# Patient Record
Sex: FEMALE | Race: OTHER | HISPANIC OR LATINO | Employment: PART TIME | URBAN - METROPOLITAN AREA
[De-identification: names, ages, dates, MRNs, and addresses within clinical notes are randomized per-mention and may not be internally consistent; named-entity substitution may affect disease eponyms.]

---

## 2017-06-14 ENCOUNTER — HOSPITAL ENCOUNTER (INPATIENT)
Facility: MEDICAL CENTER | Age: 57
LOS: 3 days | DRG: 872 | End: 2017-06-17
Attending: EMERGENCY MEDICINE | Admitting: UROLOGY
Payer: COMMERCIAL

## 2017-06-14 ENCOUNTER — RESOLUTE PROFESSIONAL BILLING HOSPITAL PROF FEE (OUTPATIENT)
Dept: HOSPITALIST | Facility: MEDICAL CENTER | Age: 57
End: 2017-06-14
Payer: COMMERCIAL

## 2017-06-14 ENCOUNTER — APPOINTMENT (OUTPATIENT)
Dept: RADIOLOGY | Facility: MEDICAL CENTER | Age: 57
DRG: 872 | End: 2017-06-14
Attending: UROLOGY
Payer: COMMERCIAL

## 2017-06-14 ENCOUNTER — HOSPITAL ENCOUNTER (OUTPATIENT)
Dept: RADIOLOGY | Facility: MEDICAL CENTER | Age: 57
End: 2017-06-14
Attending: UROLOGY
Payer: COMMERCIAL

## 2017-06-14 DIAGNOSIS — N20.0 URIC ACID NEPHROLITHIASIS: ICD-10-CM

## 2017-06-14 LAB
ALBUMIN SERPL BCP-MCNC: 3.6 G/DL (ref 3.2–4.9)
ALBUMIN/GLOB SERPL: 1 G/DL
ALP SERPL-CCNC: 66 U/L (ref 30–99)
ALT SERPL-CCNC: 14 U/L (ref 2–50)
ANION GAP SERPL CALC-SCNC: 11 MMOL/L (ref 0–11.9)
APPEARANCE UR: ABNORMAL
APTT PPP: 37 SEC (ref 24.7–36)
AST SERPL-CCNC: 17 U/L (ref 12–45)
BACTERIA #/AREA URNS HPF: ABNORMAL /HPF
BASOPHILS # BLD AUTO: 0.1 % (ref 0–1.8)
BASOPHILS # BLD: 0.01 K/UL (ref 0–0.12)
BILIRUB SERPL-MCNC: 0.9 MG/DL (ref 0.1–1.5)
BILIRUB UR QL STRIP.AUTO: NEGATIVE
BUN SERPL-MCNC: 20 MG/DL (ref 8–22)
CALCIUM SERPL-MCNC: 8.5 MG/DL (ref 8.4–10.2)
CHLORIDE SERPL-SCNC: 100 MMOL/L (ref 96–112)
CO2 SERPL-SCNC: 21 MMOL/L (ref 20–33)
COLOR UR: YELLOW
CREAT SERPL-MCNC: 1.27 MG/DL (ref 0.5–1.4)
CULTURE IF INDICATED INDCX: YES UA CULTURE
EOSINOPHIL # BLD AUTO: 0 K/UL (ref 0–0.51)
EOSINOPHIL NFR BLD: 0 % (ref 0–6.9)
EPI CELLS #/AREA URNS HPF: ABNORMAL /HPF
ERYTHROCYTE [DISTWIDTH] IN BLOOD BY AUTOMATED COUNT: 44.5 FL (ref 35.9–50)
GFR SERPL CREATININE-BSD FRML MDRD: 43 ML/MIN/1.73 M 2
GLOBULIN SER CALC-MCNC: 3.5 G/DL (ref 1.9–3.5)
GLUCOSE SERPL-MCNC: 169 MG/DL (ref 65–99)
GLUCOSE UR STRIP.AUTO-MCNC: NEGATIVE MG/DL
HCT VFR BLD AUTO: 36.9 % (ref 37–47)
HGB BLD-MCNC: 12.6 G/DL (ref 12–16)
IMM GRANULOCYTES # BLD AUTO: 0.07 K/UL (ref 0–0.11)
IMM GRANULOCYTES NFR BLD AUTO: 0.5 % (ref 0–0.9)
INR PPP: 1.2 (ref 0.87–1.13)
KETONES UR STRIP.AUTO-MCNC: NEGATIVE MG/DL
LACTATE BLD-SCNC: 0.91 MMOL/L (ref 0.5–2)
LEUKOCYTE ESTERASE UR QL STRIP.AUTO: ABNORMAL
LYMPHOCYTES # BLD AUTO: 1.25 K/UL (ref 1–4.8)
LYMPHOCYTES NFR BLD: 8.3 % (ref 22–41)
MAGNESIUM SERPL-MCNC: 1.9 MG/DL (ref 1.5–2.5)
MCH RBC QN AUTO: 30.7 PG (ref 27–33)
MCHC RBC AUTO-ENTMCNC: 34.1 G/DL (ref 33.6–35)
MCV RBC AUTO: 90 FL (ref 81.4–97.8)
MICRO URNS: ABNORMAL
MONOCYTES # BLD AUTO: 1.41 K/UL (ref 0–0.85)
MONOCYTES NFR BLD AUTO: 9.4 % (ref 0–13.4)
MUCOUS THREADS #/AREA URNS HPF: ABNORMAL /HPF
NEUTROPHILS # BLD AUTO: 12.3 K/UL (ref 2–7.15)
NEUTROPHILS NFR BLD: 81.7 % (ref 44–72)
NITRITE UR QL STRIP.AUTO: NEGATIVE
NRBC # BLD AUTO: 0 K/UL
NRBC BLD AUTO-RTO: 0 /100 WBC
PH UR STRIP.AUTO: 5.5 [PH]
PHOSPHATE SERPL-MCNC: 2.7 MG/DL (ref 2.5–4.5)
PLATELET # BLD AUTO: 244 K/UL (ref 164–446)
PMV BLD AUTO: 8.6 FL (ref 9–12.9)
POTASSIUM SERPL-SCNC: 3.5 MMOL/L (ref 3.6–5.5)
PROT SERPL-MCNC: 7.1 G/DL (ref 6–8.2)
PROT UR QL STRIP: 30 MG/DL
PROTHROMBIN TIME: 15 SEC (ref 12–14.6)
RBC # BLD AUTO: 4.1 M/UL (ref 4.2–5.4)
RBC # URNS HPF: ABNORMAL /HPF
RBC UR QL AUTO: ABNORMAL
SODIUM SERPL-SCNC: 132 MMOL/L (ref 135–145)
SP GR UR STRIP.AUTO: 1.02
SPECIMEN DRAWN FROM PATIENT: NORMAL
TROPONIN I SERPL-MCNC: <0.02 NG/ML (ref 0–0.04)
TSH SERPL DL<=0.005 MIU/L-ACNC: 0.5 UIU/ML (ref 0.35–5.5)
UNIDENT CRYS URNS QL MICRO: ABNORMAL /HPF
WBC # BLD AUTO: 15 K/UL (ref 4.8–10.8)
WBC #/AREA URNS HPF: ABNORMAL /HPF

## 2017-06-14 PROCEDURE — 87040 BLOOD CULTURE FOR BACTERIA: CPT | Mod: 91

## 2017-06-14 PROCEDURE — 770006 HCHG ROOM/CARE - MED/SURG/GYN SEMI*

## 2017-06-14 PROCEDURE — 160048 HCHG OR STATISTICAL LEVEL 1-5: Performed by: UROLOGY

## 2017-06-14 PROCEDURE — 99291 CRITICAL CARE FIRST HOUR: CPT

## 2017-06-14 PROCEDURE — 501329 HCHG SET, CYSTO IRRIG Y TUR: Performed by: UROLOGY

## 2017-06-14 PROCEDURE — 700102 HCHG RX REV CODE 250 W/ 637 OVERRIDE(OP): Performed by: HOSPITALIST

## 2017-06-14 PROCEDURE — 84100 ASSAY OF PHOSPHORUS: CPT

## 2017-06-14 PROCEDURE — 160035 HCHG PACU - 1ST 60 MINS PHASE I: Performed by: UROLOGY

## 2017-06-14 PROCEDURE — C1758 CATHETER, URETERAL: HCPCS | Performed by: UROLOGY

## 2017-06-14 PROCEDURE — 82533 TOTAL CORTISOL: CPT

## 2017-06-14 PROCEDURE — 0T768DZ DILATION OF RIGHT URETER WITH INTRALUMINAL DEVICE, VIA NATURAL OR ARTIFICIAL OPENING ENDOSCOPIC: ICD-10-PCS | Performed by: UROLOGY

## 2017-06-14 PROCEDURE — 87086 URINE CULTURE/COLONY COUNT: CPT

## 2017-06-14 PROCEDURE — 700101 HCHG RX REV CODE 250

## 2017-06-14 PROCEDURE — 160028 HCHG SURGERY MINUTES - 1ST 30 MINS LEVEL 3: Performed by: UROLOGY

## 2017-06-14 PROCEDURE — 96366 THER/PROPH/DIAG IV INF ADDON: CPT

## 2017-06-14 PROCEDURE — C1769 GUIDE WIRE: HCPCS | Performed by: UROLOGY

## 2017-06-14 PROCEDURE — A9270 NON-COVERED ITEM OR SERVICE: HCPCS | Performed by: HOSPITALIST

## 2017-06-14 PROCEDURE — 87077 CULTURE AEROBIC IDENTIFY: CPT

## 2017-06-14 PROCEDURE — 96365 THER/PROPH/DIAG IV INF INIT: CPT

## 2017-06-14 PROCEDURE — 80053 COMPREHEN METABOLIC PANEL: CPT

## 2017-06-14 PROCEDURE — 160039 HCHG SURGERY MINUTES - EA ADDL 1 MIN LEVEL 3: Performed by: UROLOGY

## 2017-06-14 PROCEDURE — 83735 ASSAY OF MAGNESIUM: CPT

## 2017-06-14 PROCEDURE — C2617 STENT, NON-COR, TEM W/O DEL: HCPCS | Performed by: UROLOGY

## 2017-06-14 PROCEDURE — 84443 ASSAY THYROID STIM HORMONE: CPT

## 2017-06-14 PROCEDURE — 700111 HCHG RX REV CODE 636 W/ 250 OVERRIDE (IP)

## 2017-06-14 PROCEDURE — 51701 INSERT BLADDER CATHETER: CPT

## 2017-06-14 PROCEDURE — 99223 1ST HOSP IP/OBS HIGH 75: CPT | Performed by: HOSPITALIST

## 2017-06-14 PROCEDURE — 74176 CT ABD & PELVIS W/O CONTRAST: CPT

## 2017-06-14 PROCEDURE — 36415 COLL VENOUS BLD VENIPUNCTURE: CPT

## 2017-06-14 PROCEDURE — 85610 PROTHROMBIN TIME: CPT

## 2017-06-14 PROCEDURE — 87186 SC STD MICRODIL/AGAR DIL: CPT

## 2017-06-14 PROCEDURE — 500879 HCHG PACK, CYSTO: Performed by: UROLOGY

## 2017-06-14 PROCEDURE — 700105 HCHG RX REV CODE 258: Performed by: EMERGENCY MEDICINE

## 2017-06-14 PROCEDURE — 700105 HCHG RX REV CODE 258: Performed by: HOSPITALIST

## 2017-06-14 PROCEDURE — 700117 HCHG RX CONTRAST REV CODE 255

## 2017-06-14 PROCEDURE — 81001 URINALYSIS AUTO W/SCOPE: CPT

## 2017-06-14 PROCEDURE — 84484 ASSAY OF TROPONIN QUANT: CPT

## 2017-06-14 PROCEDURE — 85730 THROMBOPLASTIN TIME PARTIAL: CPT

## 2017-06-14 PROCEDURE — 160002 HCHG RECOVERY MINUTES (STAT): Performed by: UROLOGY

## 2017-06-14 PROCEDURE — 700111 HCHG RX REV CODE 636 W/ 250 OVERRIDE (IP): Performed by: EMERGENCY MEDICINE

## 2017-06-14 PROCEDURE — 85025 COMPLETE CBC W/AUTO DIFF WBC: CPT

## 2017-06-14 PROCEDURE — 160009 HCHG ANES TIME/MIN: Performed by: UROLOGY

## 2017-06-14 PROCEDURE — 84145 PROCALCITONIN (PCT): CPT

## 2017-06-14 PROCEDURE — A4314 CATH W/DRAINAGE 2-WAY LATEX: HCPCS | Performed by: UROLOGY

## 2017-06-14 PROCEDURE — 83605 ASSAY OF LACTIC ACID: CPT

## 2017-06-14 DEVICE — STENT UROLOGICAL POLARIS 6X24  ULTRA: Type: IMPLANTABLE DEVICE | Site: URETER | Status: FUNCTIONAL

## 2017-06-14 RX ORDER — CEFTRIAXONE 1 G/1
1 INJECTION, POWDER, FOR SOLUTION INTRAMUSCULAR; INTRAVENOUS ONCE
Status: COMPLETED | OUTPATIENT
Start: 2017-06-14 | End: 2017-06-14

## 2017-06-14 RX ORDER — SODIUM CHLORIDE 9 MG/ML
30 INJECTION, SOLUTION INTRAVENOUS ONCE
Status: COMPLETED | OUTPATIENT
Start: 2017-06-14 | End: 2017-06-14

## 2017-06-14 RX ORDER — ONDANSETRON 4 MG/1
4 TABLET, ORALLY DISINTEGRATING ORAL EVERY 4 HOURS PRN
Status: DISCONTINUED | OUTPATIENT
Start: 2017-06-14 | End: 2017-06-17 | Stop reason: HOSPADM

## 2017-06-14 RX ORDER — ONDANSETRON 2 MG/ML
4 INJECTION INTRAMUSCULAR; INTRAVENOUS ONCE
Status: COMPLETED | OUTPATIENT
Start: 2017-06-14 | End: 2017-06-14

## 2017-06-14 RX ORDER — AMOXICILLIN 250 MG
2 CAPSULE ORAL 2 TIMES DAILY
Status: DISCONTINUED | OUTPATIENT
Start: 2017-06-15 | End: 2017-06-17 | Stop reason: HOSPADM

## 2017-06-14 RX ORDER — ONDANSETRON 2 MG/ML
4 INJECTION INTRAMUSCULAR; INTRAVENOUS EVERY 4 HOURS PRN
Status: DISCONTINUED | OUTPATIENT
Start: 2017-06-14 | End: 2017-06-17 | Stop reason: HOSPADM

## 2017-06-14 RX ORDER — HEPARIN SODIUM 5000 [USP'U]/ML
5000 INJECTION, SOLUTION INTRAVENOUS; SUBCUTANEOUS EVERY 8 HOURS
Status: DISCONTINUED | OUTPATIENT
Start: 2017-06-15 | End: 2017-06-17 | Stop reason: HOSPADM

## 2017-06-14 RX ORDER — POLYETHYLENE GLYCOL 3350 17 G/17G
1 POWDER, FOR SOLUTION ORAL
Status: DISCONTINUED | OUTPATIENT
Start: 2017-06-14 | End: 2017-06-17 | Stop reason: HOSPADM

## 2017-06-14 RX ORDER — SODIUM CHLORIDE 9 MG/ML
INJECTION, SOLUTION INTRAVENOUS CONTINUOUS
Status: DISCONTINUED | OUTPATIENT
Start: 2017-06-14 | End: 2017-06-16

## 2017-06-14 RX ORDER — ACETAMINOPHEN 325 MG/1
650 TABLET ORAL EVERY 6 HOURS PRN
Status: DISCONTINUED | OUTPATIENT
Start: 2017-06-14 | End: 2017-06-17 | Stop reason: HOSPADM

## 2017-06-14 RX ORDER — HYDROCODONE BITARTRATE AND ACETAMINOPHEN 5; 325 MG/1; MG/1
1 TABLET ORAL EVERY 4 HOURS PRN
Status: ON HOLD | COMMUNITY
Start: 2017-05-30 | End: 2017-06-17

## 2017-06-14 RX ORDER — MEPERIDINE HYDROCHLORIDE 25 MG/ML
INJECTION INTRAMUSCULAR; INTRAVENOUS; SUBCUTANEOUS
Status: COMPLETED
Start: 2017-06-14 | End: 2017-06-14

## 2017-06-14 RX ORDER — PROMETHAZINE HYDROCHLORIDE 25 MG/1
12.5-25 TABLET ORAL EVERY 4 HOURS PRN
Status: DISCONTINUED | OUTPATIENT
Start: 2017-06-14 | End: 2017-06-17 | Stop reason: HOSPADM

## 2017-06-14 RX ORDER — POTASSIUM CHLORIDE 20 MEQ/1
20 TABLET, EXTENDED RELEASE ORAL 2 TIMES DAILY
Status: DISCONTINUED | OUTPATIENT
Start: 2017-06-14 | End: 2017-06-17 | Stop reason: HOSPADM

## 2017-06-14 RX ORDER — PROMETHAZINE HYDROCHLORIDE 25 MG/1
12.5-25 SUPPOSITORY RECTAL EVERY 4 HOURS PRN
Status: DISCONTINUED | OUTPATIENT
Start: 2017-06-14 | End: 2017-06-17 | Stop reason: HOSPADM

## 2017-06-14 RX ORDER — MORPHINE SULFATE 4 MG/ML
4 INJECTION, SOLUTION INTRAMUSCULAR; INTRAVENOUS ONCE
Status: COMPLETED | OUTPATIENT
Start: 2017-06-14 | End: 2017-06-14

## 2017-06-14 RX ORDER — SODIUM CHLORIDE 9 MG/ML
30 INJECTION, SOLUTION INTRAVENOUS
Status: DISCONTINUED | OUTPATIENT
Start: 2017-06-14 | End: 2017-06-14

## 2017-06-14 RX ORDER — BISACODYL 10 MG
10 SUPPOSITORY, RECTAL RECTAL
Status: DISCONTINUED | OUTPATIENT
Start: 2017-06-14 | End: 2017-06-17 | Stop reason: HOSPADM

## 2017-06-14 RX ORDER — SODIUM CHLORIDE 9 MG/ML
1000 INJECTION, SOLUTION INTRAVENOUS
Status: DISCONTINUED | OUTPATIENT
Start: 2017-06-14 | End: 2017-06-17 | Stop reason: HOSPADM

## 2017-06-14 RX ORDER — TEMAZEPAM 15 MG/1
15 CAPSULE ORAL
Status: DISCONTINUED | OUTPATIENT
Start: 2017-06-14 | End: 2017-06-17 | Stop reason: HOSPADM

## 2017-06-14 RX ADMIN — MEPERIDINE HYDROCHLORIDE 12.5 MG: 25 INJECTION INTRAMUSCULAR; INTRAVENOUS; SUBCUTANEOUS at 21:36

## 2017-06-14 RX ADMIN — CEFTRIAXONE SODIUM 1 G: 1 INJECTION, POWDER, FOR SOLUTION INTRAMUSCULAR; INTRAVENOUS at 19:52

## 2017-06-14 RX ADMIN — POTASSIUM CHLORIDE 20 MEQ: 1500 TABLET, EXTENDED RELEASE ORAL at 23:10

## 2017-06-14 RX ADMIN — SODIUM CHLORIDE 1000 ML: 9 INJECTION, SOLUTION INTRAVENOUS at 19:41

## 2017-06-14 RX ADMIN — MORPHINE SULFATE 4 MG: 4 INJECTION INTRAVENOUS at 19:43

## 2017-06-14 RX ADMIN — CEFTRIAXONE SODIUM 1 G: 1 INJECTION, POWDER, FOR SOLUTION INTRAMUSCULAR; INTRAVENOUS at 20:40

## 2017-06-14 RX ADMIN — ONDANSETRON 4 MG: 2 INJECTION INTRAMUSCULAR; INTRAVENOUS at 19:43

## 2017-06-14 RX ADMIN — SODIUM CHLORIDE 1000 ML: 9 INJECTION, SOLUTION INTRAVENOUS at 21:30

## 2017-06-14 RX ADMIN — SODIUM CHLORIDE: 9 INJECTION, SOLUTION INTRAVENOUS at 22:52

## 2017-06-14 ASSESSMENT — LIFESTYLE VARIABLES
DO YOU DRINK ALCOHOL: NO
EVER_SMOKED: NEVER

## 2017-06-14 ASSESSMENT — PAIN SCALES - GENERAL
PAINLEVEL_OUTOF10: 10
PAINLEVEL_OUTOF10: ASSUMED PAIN PRESENT
PAINLEVEL_OUTOF10: ASSUMED PAIN PRESENT
PAINLEVEL_OUTOF10: 5
PAINLEVEL_OUTOF10: 0

## 2017-06-14 NOTE — IP AVS SNAPSHOT
6/17/2017    Es Evans  [address]  [place] [place] [address]    Dear Es:    [facility] wants to ensure your discharge home is safe and you or your loved ones have had all of your questions answered regarding your care after you leave the hospital.    Below is a list of resources and contact information should you have any questions regarding your hospital stay, follow-up instructions, or active medical symptoms.    Questions or Concerns Regarding… Contact   Medical Questions Related to Your Discharge  (7 days a week, 8am-5pm) Contact a Nurse Care Coordinator   [phone]   Medical Questions Not Related to Your Discharge  (24 hours a day / 7 days a week)  Contact the Nurse Health Line   [phone]    Medications or Discharge Instructions Refer to your discharge packet   or contact your [facility] Primary Care Provider   [phone]   Follow-up Appointment(s) Schedule your appointment via VIRxSYS   or contact Scheduling [phone]   Billing Review your statement via VIRxSYS  or contact Billing [phone]   Medical Records Review your records via VIRxSYS   or contact Medical Records [phone]     You may receive a telephone call within two days of discharge. This call is to make certain you understand your discharge instructions and have the opportunity to have any questions answered. You can also easily access your medical information, test results and upcoming appointments via the VIRxSYS free online health management tool. You can learn more and sign up at [employer]/VIRxSYS. For assistance setting up your VIRxSYS account, please call [phone].    Once again, we want to ensure your discharge home is safe and that you have a clear understanding of any next steps in your care. If you have any questions or concerns, please do not hesitate to contact us, we are here for you. Thank you for choosing [facility] for your healthcare needs.    Sincerely,    Your [facility] Healthcare Team

## 2017-06-14 NOTE — IP AVS SNAPSHOT
" <p align=\"LEFT\"><IMG SRC=\"//[email or web address]/blob$/[email or web address]\" alt=\"Image\" WIDTH=\"50%\" HEIGHT=\"200\" BORDER=\"\"></p>                   Name:Es Evans  Medical Record Number:[id number]  CSN: [id number]    YOB: 1960   Age: 56 y.o.  Sex: female  HT:1.626 m (5' 4\") WT: 69.3 kg (152 lb 12.5 oz)          Admit Date: 6/14/2017     Discharge Date:   Today's Date: 6/17/2017  Attending Doctor:  Shankar Lee D.O.                  Allergies:  Review of patient's allergies indicates no known allergies.          Follow-up Information     1. Follow up with Asad Silva M.D. In 2 weeks.    Specialty:  Urology    Why:  Office will call and schedule stent removal     Contact information    [address] [place] [address] [phone]          2. Follow up with Pcp Pt States None In 1 week.    Specialty:  Family Medicine         Medication List      Take these Medications        Instructions    hydrocodone-acetaminophen 5-325 MG Tabs per tablet   What changed:  how much to take   Commonly known as:  NORCO    Take 1-2 Tabs by mouth every four hours as needed for up to 7 days.   Dose:  1-2 Tab       nitrofurantoin macro crystals 100 MG Caps   Commonly known as:  MACRODANTIN    Take 1 Cap by mouth 4 times a day for 7 days.   Dose:  100 mg       ondansetron 4 MG Tbdp   What changed:    - when to take this  - reasons to take this   Commonly known as:  ZOFRAN ODT    Take 1 Tab by mouth every 6 hours as needed for Nausea/Vomiting.   Dose:  4 mg         "

## 2017-06-14 NOTE — IP AVS SNAPSHOT
Solexant Access Code: [email or web address]  Expires: 7/17/2017  5:09 PM    Your email address is not on file at [facility].  Email Addresses are required for you to sign up for Solexant, please contact [phone] to verify your personal information and to provide your email address prior to attempting to register for Solexant.    Es Evans   [address]  [place] [address]    Solexant  A secure, online tool to manage your health information     [facility]’s Solexant® is a secure, online tool that connects you to your personalized health information from the privacy of your home -- day or night - making it very easy for you to manage your healthcare. Once the activation process is completed, you can even access your medical information using the Solexant dangelo, which is available for free in the Apple Dangelo store or Google Play store.     To learn more about Solexant, visit [email or web address]    There are two levels of access available (as shown below):   My Chart Features  [facility] Primary Care Doctor [facility]  Specialists [facility]  Urgent  Care Non-[facility] Primary Care Doctor   Email your healthcare team securely and privately 24/7 X X X    Manage appointments: schedule your next appointment; view details of past/upcoming appointments X      Request prescription refills. X      View recent personal medical records, including lab and immunizations X X X X   View health record, including health history, allergies, medications X X X X   Read reports about your outpatient visits, procedures, consult and ER notes X X X X   See your discharge summary, which is a recap of your hospital and/or ER visit that includes your diagnosis, lab results, and care plan X X  X     How to register for MightyTextt:  Once your e-mail address has been verified, follow the following steps to sign up for Solexant.     1. Go to  [email or web address]  2. Click on the Sign Up Now box, which takes you to the New Member Sign Up page. You will need to  provide the following information:  a. Enter your [employer] Access Code exactly as it appears at the top of this page. (You will not need to use this code after you’ve completed the sign-up process. If you do not sign up before the expiration date, you must request a new code.)   b. Enter your date of birth.   c. Enter your home email address.   d. Click Submit, and follow the next screen’s instructions.  3. Create a [employer] ID. This will be your [employer] login ID and cannot be changed, so think of one that is secure and easy to remember.  4. Create a [employer] password. You can change your password at any time.  5. Enter your Password Reset Question and Answer. This can be used at a later time if you forget your password.   6. Enter your e-mail address. This allows you to receive e-mail notifications when new information is available in [employer].  7. Click Sign Up. You can now view your health information.    For assistance activating your [employer] account, call [phone]

## 2017-06-14 NOTE — IP AVS SNAPSHOT
" Home Care Instructions                                                                                                                  Name:Es Evans  Medical Record Number:[id number]  CSN: [id number]    YOB: 1960   Age: 56 y.o.  Sex: female  HT:1.626 m (5' 4\") WT: 69.3 kg (152 lb 12.5 oz)          Admit Date: 6/14/2017     Discharge Date:   Today's Date: 6/17/2017  Attending Doctor:  Shankar Lee D.O.                  Allergies:  Review of patient's allergies indicates no known allergies.            Discharge Instructions       Discharge Instructions    Discharged to home by car with relative. Discharged via wheelchair, hospital escort: Yes.  Special equipment needed: Not Applicable    Be sure to schedule a follow-up appointment with your primary care doctor or any specialists as instructed.     Discharge Plan: Home  Influenza Vaccine Indication: Not indicated: Previously immunized this influenza season and > 8 years of age    I understand that a diet low in cholesterol, fat, and sodium is recommended for good health. Unless I have been given specific instructions below for another diet, I accept this instruction as my diet prescription.   Other diet: as desired  Special Instructions: Follow up with Dr. Ledezma for stent removal.      · Is patient discharged on Warfarin / Coumadin?   No     · Is patient Post Blood Transfusion?  No    Depression / Suicide Risk    As you are discharged from this [facility] facility, it is important to learn how to keep safe from harming yourself.    Recognize the warning signs:  · Abrupt changes in personality, positive or negative- including increase in energy   · Giving away possessions  · Change in eating patterns- significant weight changes-  positive or negative  · Change in sleeping patterns- unable to sleep or sleeping all the time   · Unwillingness or inability to communicate  · Depression  · Unusual sadness, discouragement and loneliness  · Talk of wanting to " die  · Neglect of personal appearance   · Rebelliousness- reckless behavior  · Withdrawal from people/activities they love  · Confusion- inability to concentrate     If you or a loved one observes any of these behaviors or has concerns about self-harm, here's what you can do:  · Talk about it- your feelings and reasons for harming yourself  · Remove any means that you might use to hurt yourself (examples: pills, rope, extension cords, firearm)  · Get professional help from the community (Mental Health, Substance Abuse, psychological counseling)  · Do not be alone:Call your Safe Contact- someone whom you trust who will be there for you.  · Call your local CRISIS HOTLINE 028-9447 or 220-818-9045  · Call your local Children's Mobile Crisis Response Team [place] (652) 572-6643 or www.5min Media  · Call the toll free National Suicide Prevention Hotlines   · National Suicide Prevention Lifeline 463-307-RXQW (6183)  · cinvolve Line Network 800-SUICIDE (666-5085)        Follow-up Information     1. Follow up with Asad Silva M.D. In 2 weeks.    Specialty:  Urology    Why:  Office will call and schedule stent removal     Contact information    [address] [place] [id number] [phone]          2. Follow up with Pcp Pt States None In 1 week.    Specialty:  Family Medicine         Discharge Medication Instructions:    Below are the medications your physician expects you to take upon discharge:    Review all your home medications and newly ordered medications with your doctor and/or pharmacist. Follow medication instructions as directed by your doctor and/or pharmacist.    Please keep your medication list with you and share with your physician.               Medication List      START taking these medications        Instructions    Morning Afternoon Evening Bedtime    nitrofurantoin macro crystals 100 MG Caps   Commonly known as:  MACRODANTIN        Take 1 Cap by mouth 4 times a day for 7 days.    Dose:  100 mg                          CHANGE how you take these medications        Instructions    Morning Afternoon Evening Bedtime    hydrocodone-acetaminophen 5-325 MG Tabs per tablet   What changed:  how much to take   Commonly known as:  NORCO        Take 1-2 Tabs by mouth every four hours as needed for up to 7 days.   Dose:  1-2 Tab                        ondansetron 4 MG Tbdp   What changed:    - when to take this  - reasons to take this   Commonly known as:  ZOFRAN ODT        Take 1 Tab by mouth every 6 hours as needed for Nausea/Vomiting.   Dose:  4 mg                          STOP taking these medications     cefdinir 300 MG Caps   Commonly known as:  OMNICEF               ibuprofen 200 MG Tabs   Commonly known as:  MOTRIN               magnesium hydroxide 400 MG/5ML Susp   Commonly known as:  MILK OF MAGNESIA               promethazine 12.5 MG Supp   Commonly known as:  PHENERGAN               tramadol 50 MG Tabs   Commonly known as:  ULTRAM                    Where to Get Your Medications      You can get these medications from any pharmacy     Bring a paper prescription for each of these medications    - hydrocodone-acetaminophen 5-325 MG Tabs per tablet  - nitrofurantoin macro crystals 100 MG Caps  - ondansetron 4 MG Tbdp            Instructions           Diet / Nutrition:    Follow any diet instructions given to you by your doctor or the dietician, including how much salt (sodium) you are allowed each day.    If you are overweight, talk to your doctor about a weight reduction plan.    Activity:    Remain physically active following your doctor's instructions about exercise and activity.    Rest often.     Any time you become even a little tired or short of breath, SIT DOWN and rest.    Worsening Symptoms:    Report any of the following signs and symptoms to the doctor's office immediately:    *Pain of jaw, arm, or neck  *Chest pain not relieved by medication                               *Dizziness  or loss of consciousness  *Difficulty breathing even when at rest   *More tired than usual                                       *Bleeding drainage or swelling of surgical site  *Swelling of feet, ankles, legs or stomach                 *Fever (>100ºF)  *Pink or blood tinged sputum  *Weight gain (3lbs/day or 5lbs /week)           *Shock from internal defibrillator (if applicable)  *Palpitations or irregular heartbeats                *Cool and/or numb extremities    Stroke Awareness    Common Risk Factors for Stroke include:    Age  Atrial Fibrillation  Carotid Artery Stenosis  Diabetes Mellitus  Excessive alcohol consumption  High blood pressure  Overweight   Physical inactivity  Smoking    Warning signs and symptoms of a stroke include:    *Sudden numbness or weakness of the face, arm or leg (especially on one side of the body).  *Sudden confusion, trouble speaking or understanding.  *Sudden trouble seeing in one or both eyes.  *Sudden trouble walking, dizziness, loss of balance or coordination.Sudden severe headache with no known cause.    It is very important to get treatment quickly when a stroke occurs. If you experience any of the above warning signs, call 911 immediately.                   Disclaimer         Quit Smoking / Tobacco Use:    I understand the use of any tobacco products increases my chance of suffering from future heart disease or stroke and could cause other illnesses which may shorten my life. Quitting the use of tobacco products is the single most important thing I can do to improve my health. For further information on smoking / tobacco cessation call a Toll Free Quit Line at 1-685.683.8970 (*National Cancer [place]) or 1-131.952.4723 (American Lung Association) or you can access the web based program at www.lungusa.org.    [place] Tobacco Users Help Line:  (602) 411-7019       Toll Free: 1-807.240.1479  Quit Tobacco Program [facility] (843)180-4361    [facility]  Hotline:    National Crisis Hotline:  3-689-RYOWAEJ or 1-569.727.1135    [place] Crisis Hotline:    1-949.536.9200 or 539-245-9078    Discharge Survey:   Thank you for choosing [facility]. We hope we did everything we could to make your hospital stay a pleasant one. You may be receiving a phone survey and we would appreciate your time and participation in answering the questions. Your input is very valuable to us in our efforts to improve our service to our patients and their families.        My signature on this form indicates that:    1. I have reviewed and understand the above information.  2. My questions regarding this information have been answered to my satisfaction.  3. I have formulated a plan with my discharge nurse to obtain my prescribed medications for home.                  Disclaimer         __________________________________                     __________       ________                       Patient Signature                                                 Date                    Time

## 2017-06-15 PROBLEM — A41.9 SEPSIS, UNSPECIFIED: Status: ACTIVE | Noted: 2017-06-15

## 2017-06-15 PROBLEM — E87.1 HYPONATREMIA: Status: ACTIVE | Noted: 2017-06-15

## 2017-06-15 PROBLEM — E87.6 HYPOKALEMIA: Status: ACTIVE | Noted: 2017-06-15

## 2017-06-15 LAB
ANION GAP SERPL CALC-SCNC: 8 MMOL/L (ref 0–11.9)
BUN SERPL-MCNC: 16 MG/DL (ref 8–22)
CALCIUM SERPL-MCNC: 7.8 MG/DL (ref 8.4–10.2)
CHLORIDE SERPL-SCNC: 111 MMOL/L (ref 96–112)
CO2 SERPL-SCNC: 20 MMOL/L (ref 20–33)
CORTIS SERPL-MCNC: 27.5 UG/DL (ref 0–23)
CREAT SERPL-MCNC: 0.89 MG/DL (ref 0.5–1.4)
ERYTHROCYTE [DISTWIDTH] IN BLOOD BY AUTOMATED COUNT: 46.5 FL (ref 35.9–50)
GFR SERPL CREATININE-BSD FRML MDRD: >60 ML/MIN/1.73 M 2
GLUCOSE SERPL-MCNC: 168 MG/DL (ref 65–99)
HCT VFR BLD AUTO: 33.8 % (ref 37–47)
HGB BLD-MCNC: 11.3 G/DL (ref 12–16)
LACTATE BLD-SCNC: 0.98 MMOL/L (ref 0.5–2)
LACTATE BLD-SCNC: 1.07 MMOL/L (ref 0.5–2)
MCH RBC QN AUTO: 30.5 PG (ref 27–33)
MCHC RBC AUTO-ENTMCNC: 33.4 G/DL (ref 33.6–35)
MCV RBC AUTO: 91.1 FL (ref 81.4–97.8)
PLATELET # BLD AUTO: 204 K/UL (ref 164–446)
PMV BLD AUTO: 8.7 FL (ref 9–12.9)
POTASSIUM SERPL-SCNC: 4 MMOL/L (ref 3.6–5.5)
PROCALCITONIN SERPL-MCNC: 0.06 NG/ML
RBC # BLD AUTO: 3.71 M/UL (ref 4.2–5.4)
SODIUM SERPL-SCNC: 139 MMOL/L (ref 135–145)
SPECIMEN DRAWN FROM PATIENT: NORMAL
SPECIMEN DRAWN FROM PATIENT: NORMAL
TROPONIN I SERPL-MCNC: <0.02 NG/ML (ref 0–0.04)
TROPONIN I SERPL-MCNC: <0.02 NG/ML (ref 0–0.04)
WBC # BLD AUTO: 13.9 K/UL (ref 4.8–10.8)

## 2017-06-15 PROCEDURE — 700111 HCHG RX REV CODE 636 W/ 250 OVERRIDE (IP): Performed by: HOSPITALIST

## 2017-06-15 PROCEDURE — A9270 NON-COVERED ITEM OR SERVICE: HCPCS | Performed by: HOSPITALIST

## 2017-06-15 PROCEDURE — 80048 BASIC METABOLIC PNL TOTAL CA: CPT

## 2017-06-15 PROCEDURE — 83605 ASSAY OF LACTIC ACID: CPT

## 2017-06-15 PROCEDURE — 700105 HCHG RX REV CODE 258: Performed by: HOSPITALIST

## 2017-06-15 PROCEDURE — 85027 COMPLETE CBC AUTOMATED: CPT

## 2017-06-15 PROCEDURE — 700111 HCHG RX REV CODE 636 W/ 250 OVERRIDE (IP)

## 2017-06-15 PROCEDURE — 700105 HCHG RX REV CODE 258: Performed by: INTERNAL MEDICINE

## 2017-06-15 PROCEDURE — 36415 COLL VENOUS BLD VENIPUNCTURE: CPT

## 2017-06-15 PROCEDURE — 700105 HCHG RX REV CODE 258

## 2017-06-15 PROCEDURE — 770006 HCHG ROOM/CARE - MED/SURG/GYN SEMI*

## 2017-06-15 PROCEDURE — 700102 HCHG RX REV CODE 250 W/ 637 OVERRIDE(OP): Performed by: HOSPITALIST

## 2017-06-15 PROCEDURE — 84484 ASSAY OF TROPONIN QUANT: CPT

## 2017-06-15 PROCEDURE — 99233 SBSQ HOSP IP/OBS HIGH 50: CPT | Performed by: INTERNAL MEDICINE

## 2017-06-15 RX ORDER — SODIUM CHLORIDE 9 MG/ML
500 INJECTION, SOLUTION INTRAVENOUS ONCE
Status: COMPLETED | OUTPATIENT
Start: 2017-06-15 | End: 2017-06-15

## 2017-06-15 RX ORDER — SODIUM CHLORIDE 9 MG/ML
500 INJECTION, SOLUTION INTRAVENOUS
Status: COMPLETED | OUTPATIENT
Start: 2017-06-15 | End: 2017-06-15

## 2017-06-15 RX ORDER — OXYCODONE HYDROCHLORIDE 5 MG/1
5 TABLET ORAL EVERY 4 HOURS PRN
Status: DISCONTINUED | OUTPATIENT
Start: 2017-06-15 | End: 2017-06-17 | Stop reason: HOSPADM

## 2017-06-15 RX ADMIN — SODIUM CHLORIDE: 9 INJECTION, SOLUTION INTRAVENOUS at 04:37

## 2017-06-15 RX ADMIN — SODIUM CHLORIDE 500 ML: 9 INJECTION, SOLUTION INTRAVENOUS at 05:27

## 2017-06-15 RX ADMIN — ACETAMINOPHEN 650 MG: 325 TABLET, FILM COATED ORAL at 20:18

## 2017-06-15 RX ADMIN — HEPARIN SODIUM 5000 UNITS: 5000 INJECTION, SOLUTION INTRAVENOUS; SUBCUTANEOUS at 14:15

## 2017-06-15 RX ADMIN — PIPERACILLIN AND TAZOBACTAM 3.38 G: 3; .375 INJECTION, POWDER, LYOPHILIZED, FOR SOLUTION INTRAVENOUS; PARENTERAL at 00:06

## 2017-06-15 RX ADMIN — SODIUM CHLORIDE: 9 INJECTION, SOLUTION INTRAVENOUS at 11:42

## 2017-06-15 RX ADMIN — HEPARIN SODIUM 5000 UNITS: 5000 INJECTION, SOLUTION INTRAVENOUS; SUBCUTANEOUS at 05:47

## 2017-06-15 RX ADMIN — POTASSIUM CHLORIDE 20 MEQ: 1500 TABLET, EXTENDED RELEASE ORAL at 20:18

## 2017-06-15 RX ADMIN — PIPERACILLIN AND TAZOBACTAM 3.38 G: 3; .375 INJECTION, POWDER, FOR SOLUTION INTRAVENOUS at 18:00

## 2017-06-15 RX ADMIN — Medication 2 TABLET: at 08:11

## 2017-06-15 RX ADMIN — OXYCODONE HYDROCHLORIDE 5 MG: 5 TABLET ORAL at 23:18

## 2017-06-15 RX ADMIN — POTASSIUM CHLORIDE 20 MEQ: 1500 TABLET, EXTENDED RELEASE ORAL at 08:11

## 2017-06-15 RX ADMIN — SODIUM CHLORIDE 500 ML: 9 INJECTION, SOLUTION INTRAVENOUS at 01:10

## 2017-06-15 RX ADMIN — ACETAMINOPHEN 650 MG: 325 TABLET, FILM COATED ORAL at 08:13

## 2017-06-15 RX ADMIN — ACETAMINOPHEN 650 MG: 325 TABLET, FILM COATED ORAL at 14:15

## 2017-06-15 RX ADMIN — PIPERACILLIN AND TAZOBACTAM 3.38 G: 3; .375 INJECTION, POWDER, FOR SOLUTION INTRAVENOUS at 23:23

## 2017-06-15 RX ADMIN — PIPERACILLIN AND TAZOBACTAM 3.38 G: 3; .375 INJECTION, POWDER, FOR SOLUTION INTRAVENOUS at 11:42

## 2017-06-15 RX ADMIN — ACETAMINOPHEN 650 MG: 325 TABLET, FILM COATED ORAL at 02:07

## 2017-06-15 RX ADMIN — PIPERACILLIN AND TAZOBACTAM 3.38 G: 3; .375 INJECTION, POWDER, FOR SOLUTION INTRAVENOUS at 05:48

## 2017-06-15 RX ADMIN — Medication 2 TABLET: at 20:18

## 2017-06-15 ASSESSMENT — PAIN SCALES - GENERAL
PAINLEVEL_OUTOF10: ASSUMED PAIN PRESENT
PAINLEVEL_OUTOF10: 10
PAINLEVEL_OUTOF10: 8
PAINLEVEL_OUTOF10: 6

## 2017-06-15 ASSESSMENT — ENCOUNTER SYMPTOMS
HEADACHES: 0
FLANK PAIN: 1
SHORTNESS OF BREATH: 0
HEARTBURN: 0
FEVER: 0
ABDOMINAL PAIN: 1

## 2017-06-15 NOTE — H&P
DATE OF ADMISSION:  06/14/2017    CHIEF COMPLAINT:  Left flank pain.    PRIMARY CARE PHYSICIAN:  Unassigned.    [facility]    ON CONSULT:  Asad Silva MD, urology.    DIAGNOSIS:  1.  Acute nephrolithiasis.  2.  Acute sepsis due to Nephrolithiasis.    HISTORY OF CURRENT ILLNESS:  The patient is a 56-year-old female, who started   to have right flank pain for the past few weeks.  She was seen by urgent care   today and she was sent over to the emergency room.  The patient was evaluated   and found to have a kidney stone with an infectious process ongoing.  The   patient at this point is septic with elevated white blood cell count of   15,000.  Her temperature at this point is also elevated at 39.2, her blood   pressure is low at 93/52.  The patient at this point has a sores and at this   point, she meets sepsis criteria.  Patient at this point will be given fluid   resuscitation as well as monitoring of her lactic acid levels.  The patient at   this point will be placed on IV Zosyn for the infection.  Patient was given 1   dose of Rocephin in the emergency room, patient at this point has undergone a   ureter stent placement and it is expected that within the next 48 hours the   patient will have a basic a kidney stone removal.  The patient for right now   will be allowed to eat prior to surgery.  Patient will need to be made n.p.o.   again.    PAST MEDICAL HISTORY:  Includes, kidney stones also history of previous   lithotripsy.    ALLERGIES:  She has no known drug allergies.    MEDICATIONS:  At the time of  admission includes, Omnicef 300 mg every 12   hours, ibuprofen 200 mg every 6 hours for pain, milk of magnesia p.r.n. for   constipation, Zofran 4 mg p.r.n. for nausea and vomiting, Phenergan 12.5 mg   p.r.n. for nausea and vomiting, if Zofran not effective and tramadol 50 mg   every 6 hours p.r.n. for pain.    SOCIAL HISTORY:  She has never smoked.  No drugs, no alcohol.   She is full   code status:  No known allergies.    FAMILY HISTORY:  Really unable to be obtained as patient postsurgically is not   able to give me family history.    REVIEW OF SYSTEMS:  At this point, she does complain of a mild headache.    Denies any fevers, chills, nausea or vomiting.  Denies any chest pain,   shortness of breath.  Does complain flank pain also in the right, but also on   the left.  Denies at this point any problems with constipation or diarrhea.    Denies any weakness in the legs.  All other review of systems were reviewed   and are negative.    PHYSICAL EXAMINATION:  VITAL SIGNS:  Temperature is 39.2 with a pulse 82, respirations 20, blood   pressure 93/52.  The patient is 69 kilograms in weight and 162 cm tall.  GENERAL:  She is currently alert, awake, oriented after surgery, but drowsy   and easily falls asleep.  HEENT:  Head atraumatic.  Eyes follow in normal range of gaze.  Pupils are   equal, round, reactive bilaterally.  Nose midline without discharge.  Ears   bilaterally intact without discharge.  Oral cavity, moist mucous membranes.    No apparent focus infection in the oral cavity.  NECK:  Soft and supple.  No JVD, carotid bruit, thyromegaly or lymphadenopathy   appreciated.  CHEST WALL:  Moves equal with inspiration and expiration.  No paradoxical   motion.  No reproducible chest wall tenderness.  HEART:  S1, S2.  No murmurs or gallops detected.  LUNGS:  Clear to auscultation.  No rales or rhonchi detected.  ABDOMEN:  Soft.  Bowel sounds present in all 4 quadrants.  No hepatomegaly,   splenomegaly, rebound or tenderness elicited.  Her flank pain at this point,   has improved on the right side.  EXTREMITIES:  The patient's upper and lower extremities, positive pulses, no   edema.  Good muscle strength, good muscle tone.  Deep tendon reflexes.  GENITAL:  A Knight catheter is in place.  This will need to be in place until   urology is okay with the discontinuation of it and they want to  continue it at   least for the next 48 hours.  NEUROLOGIC:  Cranial nerves II-XII grossly within normal limits without any   focal deficits appreciated.    LABORATORY EVALUATION:  WBC count is 15, hemoglobin is 12.6, hematocrit 36.9,   platelets 244.  Sodium 132, potassium 3.5, chloride 100, CO2 is 21, BUN 20,   creatinine 1.27, calcium 8.5 with a glucose of 169.  Liver function tests are   within normal limits.  INR is 1.2.  Urinalysis shows a cloudy urine with   protein and moderate leukocyte esterase and bacteria.  Chest x-ray was not   done which will be done.    IMPRESSION AND PLAN:  At this point:  1.  Sepsis secondary to urinary tract infection.  The patient will be placed   on IV Zosyn.  We will follow lactic acid levels.  We will give her fluid   resuscitation.  2.  Nephrolithiasis, status post stent placement, kidney stone at this point   has not been removed will be removed once the infection is under control for   now.  The patient will be with a Knight catheter.  3.  The patient has fevers and chills should be given p.r.n.  Tylenol.  4.  Patient is hypotensive.  She will be given fluid resuscitation.  We will   monitor at this point fluid status.  5.  Hypokalemia.  Give potassium supplementation.  6.  Hyponatremia.  Give fluid resuscitation and this should correct the sodium   level.  7.  Leukocytosis.  Follow this.  Once the sepsis improved and leukocyte count   will come down.  5.  Mild renal azotemia with a GFR down to 43.  Patient at this point will be   given fluid resuscitation and monitored for renal functions.  Patient at this   point is full admission to the surgical floor.       ____________________________________     MD MIKE WEST / MINNA    DD:  06/14/2017 22:02:11  DT:  06/14/2017 22:26:23    D#:  [id number]  Job#:  [id number]    cc: Primary Care Physician  , Asad Silva MD

## 2017-06-15 NOTE — ED NOTES
Iv established, labs to the lab, cath ua sent to the lab. Plan of care discussed with daughter who is bilingual. Co of a lot of pain.

## 2017-06-15 NOTE — CARE PLAN
Problem: Safety  Goal: Will remain free from injury  Outcome: PROGRESSING AS EXPECTED  Injury free at present  Call light in reach  Hourly rounding

## 2017-06-15 NOTE — ED NOTES
This patient states that she has been waiting for a surgery in [place] for a kidney stone for 6 weeks but the doctor recently left. Came to [place] for a CT today and almost passed out with pain that her daughter brought her here.

## 2017-06-15 NOTE — ED NOTES
Surgical crew at the bedside. Report given, family follow-ed to the surgical area to wait. Iv infusing on transport.

## 2017-06-15 NOTE — H&P
UROLOGY Consult Note:    Asad Silva  Date & Time note created:    6/14/2017   8:22 PM     Referring MD:  Dr. HUMPHRIES  Patient ID:   Name:             Es Evans     YOB: 1960  Age:                 56 y.o.  female   MRN:               [id number]                                                             Reason for Consult:      Sepsis and d obstructing stone    History of Present Illness:    Pt seen in ED with 1.2 cm stone in distal right ureter. PAin is severe, intermittent  and radiates to groin. Lasting several days. Better with  pain meds and less with no movement. For Right Stent. H/o stonei nhte past withy CUTLS to remove stone in 2015    Review of Systems:      Constitutional:+++ fevers, Denies weight changes  Eyes: Denies changes in vision, no eye pain  Ears/Nose/Throat/Mouth: Denies nasal congestion or sore throat   Cardiovascular: no chest pain, no palpitations   Respiratory: no shortness of breath , Denies cough  Gastrointestinal/Hepatic: +++Denies abdominal pain, nausea, vomiting, no  diarrhea, constipation or GI bleeding   Genitourinary: Denies hematuria, dysuria or frequency just right flnak nad rihgt side abdo pain  Musculoskeletal/Rheum: Denies  joint pain and swelling, no edema  Skin: Denies rash  Neurological: Denies headache, confusion, memory loss or focal weakness/parasthesias  Psychiatric: denies mood disorder   Endocrine: Tracy thyroid problems  Heme/Oncology/Lymph Nodes: Denies enlarged lymph nodes, denies brusing or known bleeding disorder  All other systems were reviewed and are negative (AMA/CMS criteria)                Past Medical History:   Past Medical History   Diagnosis Date   • Kidney stone      There are no hospital problems to display for this patient.      Past Surgical History:  Past Surgical History   Procedure Laterality Date   • Lithotripsy         Hospital Medications:    Current facility-administered medications:   •  cefTRIAXone (ROCEPHIN) injection 1 g,  "1 g, Intravenous, Once, Gregory Cheney M.D.    Current outpatient prescriptions:   •  tramadol (ULTRAM) 50 MG TABS, Take 1 Tab by mouth every four hours as needed for Mild Pain or Moderate Pain., Disp: 30 Tab, Rfl: 0  •  ondansetron (ZOFRAN ODT) 4 MG TBDP, Take 1 Tab by mouth every four hours as needed for Nausea/Vomiting (give PO if no IV route available)., Disp: 20 Tab, Rfl: 1  •  promethazine (PHENERGAN) 12.5 MG SUPP, Insert 1-2 Suppositories in rectum every four hours as needed for Nausea/Vomiting (if ondansetron is ineffective and not tolerating PO.)., Disp: 15 Suppository, Rfl: 0  •  cefdinir (OMNICEF) 300 MG CAPS, Take 1 Cap by mouth every 12 hours., Disp: 24 Cap, Rfl: 0  •  magnesium hydroxide (MILK OF MAGNESIA) 400 MG/5ML SUSP, Take 30 mL by mouth 1 time daily as needed., Disp: 1 Bottle, Rfl: 3  •  ibuprofen (MOTRIN) 200 MG TABS, Take 200 mg by mouth every 6 hours as needed., Disp: , Rfl:     Current Outpatient Medications:    (Not in a hospital admission)    Medication Allergy:  No Known Allergies    Family History:  History reviewed. No pertinent family history.    Social History:  Social History     Social History   • Marital Status:      Spouse Name: N/A   • Number of Children: N/A   • Years of Education: N/A     Occupational History   • Not on file.     Social History Main Topics   • Smoking status: Never Smoker    • Smokeless tobacco: Not on file   • Alcohol Use: No   • Drug Use: No   • Sexual Activity: Not on file     Other Topics Concern   • Not on file     Social History Narrative   • No narrative on file         Physical Exam:  Vitals/ General Appearance:   Weight/BMI: Body mass index is 26.21 kg/(m^2).  Blood pressure 99/55, pulse 87, temperature 38.1 °C (100.5 °F), resp. rate 16, height 1.626 m (5' 4\"), weight 69.3 kg (152 lb 12.5 oz).  Filed Vitals:    06/14/17 1835 06/14/17 1836 06/14/17 1858 06/14/17 1946   BP:  99/55     Pulse:  101  87   Temp:  37.7 °C (99.9 °F) 38.1 °C (100.5 °F)  " "  Resp:  18  16   Height: 1.626 m (5' 4\")      Weight: 69.3 kg (152 lb 12.5 oz)        Oxygen Therapy:  O2 Delivery: None (Room Air)    Constitutional:   Well developed, Well nourished, No acute distress  HENMT:  Normocephalic, Atraumatic, Oropharynx moist mucous membranes, No oral exudates, Nose normal.  No thyromegaly.  Eyes:  EOMI, Conjunctiva normal, No discharge.  Neck:  Normal range of motion, No cervical tenderness,  no JVD.  Cardiovascular:  Normal heart rate, Normal rhythm, No murmurs, No rubs, No gallops.   Extremitites with intact distal pulses, no cyanosis, or edema.  Lungs:  Normal breath sounds, breath sounds clear to auscultation bilaterally,  no crackles, no wheezing.   Abdomen: Bowel sounds normal, Soft, No tenderness, No guarding, No rebound, No masses, No hepatosplenomegaly. + CVA TTP  Skin: Warm, Dry, No erythema, No rash, no induration.  Neurologic: Alert & oriented x 3, No focal deficits noted, cranial nerves II through X are grossly intact.  Psychiatric: Affect normal, Judgment normal, Mood normal.      MDM (Data Review):     Records reviewed and summarized in current documentation    Lab Data Review:  Recent Results (from the past 24 hour(s))   CBC WITH DIFFERENTIAL    Collection Time: 06/14/17  6:55 PM   Result Value Ref Range    WBC 15.0 (H) 4.8 - 10.8 K/uL    RBC 4.10 (L) 4.20 - 5.40 M/uL    Hemoglobin 12.6 12.0 - 16.0 g/dL    Hematocrit 36.9 (L) 37.0 - 47.0 %    MCV 90.0 81.4 - 97.8 fL    MCH 30.7 27.0 - 33.0 pg    MCHC 34.1 33.6 - 35.0 g/dL    RDW 44.5 35.9 - 50.0 fL    Platelet Count 244 164 - 446 K/uL    MPV 8.6 (L) 9.0 - 12.9 fL    Neutrophils-Polys 81.70 (H) 44.00 - 72.00 %    Lymphocytes 8.30 (L) 22.00 - 41.00 %    Monocytes 9.40 0.00 - 13.40 %    Eosinophils 0.00 0.00 - 6.90 %    Basophils 0.10 0.00 - 1.80 %    Immature Granulocytes 0.50 0.00 - 0.90 %    Nucleated RBC 0.00 /100 WBC    Neutrophils (Absolute) 12.30 (H) 2.00 - 7.15 K/uL    Lymphs (Absolute) 1.25 1.00 - 4.80 K/uL    " Monos (Absolute) 1.41 (H) 0.00 - 0.85 K/uL    Eos (Absolute) 0.00 0.00 - 0.51 K/uL    Baso (Absolute) 0.01 0.00 - 0.12 K/uL    Immature Granulocytes (abs) 0.07 0.00 - 0.11 K/uL    NRBC (Absolute) 0.00 K/uL   COMP METABOLIC PANEL    Collection Time: 06/14/17  6:55 PM   Result Value Ref Range    Sodium 132 (L) 135 - 145 mmol/L    Potassium 3.5 (L) 3.6 - 5.5 mmol/L    Chloride 100 96 - 112 mmol/L    Co2 21 20 - 33 mmol/L    Anion Gap 11.0 0.0 - 11.9    Glucose 169 (H) 65 - 99 mg/dL    Bun 20 8 - 22 mg/dL    Creatinine 1.27 0.50 - 1.40 mg/dL    Calcium 8.5 8.4 - 10.2 mg/dL    AST(SGOT) 17 12 - 45 U/L    ALT(SGPT) 14 2 - 50 U/L    Alkaline Phosphatase 66 30 - 99 U/L    Total Bilirubin 0.9 0.1 - 1.5 mg/dL    Albumin 3.6 3.2 - 4.9 g/dL    Total Protein 7.1 6.0 - 8.2 g/dL    Globulin 3.5 1.9 - 3.5 g/dL    A-G Ratio 1.0 g/dL   URINALYSIS,CULTURE IF INDICATED    Collection Time: 06/14/17  6:55 PM   Result Value Ref Range    Micro Urine Req Microscopic     Color Yellow     Character Cloudy (A)     Specific Gravity 1.020 <1.035    Ph 5.5 5.0-8.0    Glucose Negative Negative mg/dL    Ketones Negative Negative mg/dL    Protein 30 (A) Negative mg/dL    Bilirubin Negative Negative    Nitrite Negative Negative    Leukocyte Esterase Moderate (A) Negative    Occult Blood Moderate (A) Negative    Culture Indicated Yes UA Culture   PT/INR    Collection Time: 06/14/17  6:55 PM   Result Value Ref Range    PT 15.0 (H) 12.0 - 14.6 sec    INR 1.20 (H) 0.87 - 1.13   ESTIMATED GFR    Collection Time: 06/14/17  6:55 PM   Result Value Ref Range    GFR If  52 (A) >60 mL/min/1.73 m 2    GFR If Non  43 (A) >60 mL/min/1.73 m 2   URINE MICROSCOPIC (W/UA)    Collection Time: 06/14/17  6:55 PM   Result Value Ref Range    WBC 20-50 (A) /hpf    RBC 2-5 (A) /hpf    Bacteria Moderate (A) None /hpf    Epithelial Cells Rare Few /hpf    Mucous Threads Few /hpf    Urine Crystals Few Amorphous /hpf       Imaging/Procedures  Review:    Reviewed    MDM (Assessment and Plan):     There are no hospital problems to display for this patient.            ASSESSMENT/Plan: right distal one cm stone with hydro UTI and sepsis. Risk of stent placment d.w her via daughter who is . Pt expresed understanding. Risk of death sepsis pain and need for further surgery explained Risk fo damage  system explained to include strictures of  structures.

## 2017-06-15 NOTE — DISCHARGE PLANNING
Medical Social Work    Referral: Pt discussed at IDT rounds this AM.    Intervention: Per ruperto, pt lives with spouse and expects to d.c home.  No SS needs identified nor any requests for NATHAN Polo during rounds.      Plan: NATHAN available for any assistance with d.c planning.    Care Transition Team Assessment    Information Source  Orientation : Oriented x 4  Information Given By: Relative  Informant's Name: Trinity    Readmission Evaluation  Is this a readmission?: No    Elopement Risk  Legal Hold: No  Ambulatory or Self Mobile in Wheelchair: Yes  Disoriented: No  Psychiatric Symptoms: None  History of Wandering: No  Elopement this Admit: No  Vocalizing Wanting to Leave: No  Displays Behaviors, Body Language Wanting to Leave: No-Not at Risk for Elopement  Elopement Risk: Not at Risk for Elopement    Interdisciplinary Discharge Planning  Does Admitting Nurse Feel This Could be a Complex Discharge?: No  Primary Care Physician:  ([email or web address] [facility])  Lives with - Patient's Self Care Capacity: Spouse, Adult Children  Patient or legal guardian wants to designate a caregiver (see row info): No  Support Systems: Family Member(s)  Housing / Facility: 2 Story House  Do You Take your Prescribed Medications Regularly: Yes  Able to Return to Previous ADL's: Yes  Mobility Issues: No  Prior Services: None  Patient Expects to be Discharged to:: home  Assistance Needed: No  Durable Medical Equipment: Not Applicable    Discharge Preparedness  What is your plan after discharge?: Home with help  What are your discharge supports?: Spouse         Finances  Prescription Coverage: Yes    Vision / Hearing Impairment  Vision Impairment : Yes  Right Eye Vision: Wears Glasses  Left Eye Vision: Wears Glasses  Hearing Impairment : No         Advance Directive  Advance Directive?: None    Domestic Abuse  Have you ever been the victim of abuse or violence?: No    Psychological Assessment  History of Substance Abuse: None          Anticipated Discharge Information  Anticipated discharge disposition: Home

## 2017-06-15 NOTE — OP REPORT
DATE OF SERVICE:  06/14/2017    UROLOGIC OPERATIVE REPORT    INDICATION FOR PROCEDURE:  Patient has a septic stone and has been given the   option of further observation, which is not advisable; a PCNL or immediate   stenting.  She has had antibiotics and is stable.  Thus, we have decided to   place a stent.  She understands risks and benefits as detailed to her in the   preoperative H and P and agrees to proceed with her family at the bedside and   translating via her daughter.    PREOPERATIVE DIAGNOSES:  Right stone in distal ureter 1.2 cm with sepsis,   urinary tract infection and hydronephrosis.    POSTOPERATIVE DIAGNOSES:  Right stone in distal ureter 1.2 cm with sepsis,   urinary tract infection and hydronephrosis.  No pus from the ureteral orifice.    PROCEDURE PERFORMED:  Cystoscopy and right stent placement.    SURGEON:  Asad Silva MD    SPECIMEN:  None.    ESTIMATED BLOOD LOSS:  None.    FINDINGS:  Right lower ureteral stone 1.2 cm.    COMPLICATIONS:  None.    DRAIN:  6x24 stent and a 16-Chinese Knight.    DISPOSITION:  Admissions to internal medicine due to infectious sequelae and   possible need for ICU admission and transfer to [facility].  Monitor vitals   closely every 4 hours until stable x16 hours and then per routine.  The   patient will be followed by urology.  She should have follow up on her urine   culture that was sent my office this afternoon in 48-72 hours.  Should get   culture and sensitivities earlier than the one sent tonight from [facility] which   probably would not be plated until tomorrow.  They can call at [phone] for   results.  The patient will need antibiotics and supportive care, Knight until   febrile.  Follow up with urology in 2-3 weeks.  If culture is back and the   patient is stable, we could opt to do the procedure Friday depending on her   clinical progression.       ____________________________________     MD JONATHAN Rothman / MINNA    DD:  06/14/2017  21:34:49  DT:  06/14/2017 22:01:53    D#:  [id number]  Job#:  [id number]

## 2017-06-15 NOTE — PROGRESS NOTES
"Urology Progress Note    Post op Day # 1 Cystoscopy and right stent placement    Overnight Events: None    S: No fevers.  +chills   No nausea or vomiting.  Increase right flank pain     O:   Blood pressure 110/61, pulse 77, temperature 36.4 °C (97.5 °F), resp. rate 18, height 1.626 m (5' 4\"), weight 69.3 kg (152 lb 12.5 oz), SpO2 98 %, not currently breastfeeding.  Recent Labs      06/14/17   1855  06/15/17   0320   SODIUM  132*  139   POTASSIUM  3.5*  4.0   CHLORIDE  100  111   CO2  21  20   GLUCOSE  169*  168*   BUN  20  16   CREATININE  1.27  0.89   CALCIUM  8.5  7.8*     Recent Labs      06/14/17   1855  06/15/17   0320   WBC  15.0*  13.9*   RBC  4.10*  3.71*   HEMOGLOBIN  12.6  11.3*   HEMATOCRIT  36.9*  33.8*   MCV  90.0  91.1   MCH  30.7  30.5   MCHC  34.1  33.4*   RDW  44.5  46.5   PLATELETCT  244  204   MPV  8.6*  8.7*         Intake/Output Summary (Last 24 hours) at 06/15/17 1439  Last data filed at 06/15/17 0900   Gross per 24 hour   Intake   3147 ml   Output     80 ml   Net   3067 ml       Exam:  Abdomen soft, benign.    Rt CVAT   Urine: clear with wyatt      A/P:    Active Hospital Problems    Diagnosis   • Nephrolithiasis [N20.0]       PLAN:  1.  Ambulate, IS.  2.  Abx per culture results.  3.  Once we have culture results we will make further recommendations for treatment.  "

## 2017-06-15 NOTE — OR NURSING
2130: To PACU from OR via bed, very drowsy but rouses to deny pain, respirations spontaneous and non-labored. Knight draining cloudy yellow urine.   2136: medicated for shivering  2145: s/b Dr Morales, pain with exam but pt states it is tolerable, dozing when not stimulated. Fluid bolus from pre-op continued. Shivering ceased.  2200: Dr Morales aware of pt's temperature, pt sleeping, awaiting orders and then will transfer to [facility].

## 2017-06-15 NOTE — OR SURGEON
Immediate Post-Operative Note      PreOp Diagnosis: right stone in distal ureter 1.2 cm with sepsis, UTI and hydronephrosis    PostOp Diagnosis: ezio    Procedure(s):  CYSTO STENT PLACEMENT right and wyatt cather placment Wound Class: Clean Contaminated    Surgeon(s):  Asad Silva M.D.    Anesthesiologist/Type of Anesthesia:  Anesthesiologist: Mich Wilson M.D.  Anesthesia Technician: Tomi Rodarte/General    Surgical Staff:  Circulator: Rosalina Gruber R.N.  Scrub Person: Tony Brush  Radiology Technologist: Quan Mattson    Specimen: none    Estimated Blood Loss: none    Findings: RIGHT lower ureteral stone    Complications: none    6x 24 stent nad 16 F wyatt    IM to admit  Urology to consult  Pt. Has UCx send from Urology [place] office show be back in 48-72 hours - Call [phone] for results.  Pt needs IV ABx support care. Wyatt unitl AF. F/u with Urolog in 2-3 week for Stone TX if UCx back and pt stable could do stone tx on Friday will have to see how she progresses clinically        6/14/2017 9:27 PM Asad Silva

## 2017-06-15 NOTE — CARE PLAN
Problem: Safety  Goal: Will remain free from injury  Intervention: Educate patient and significant other/support system about adaptive mobility strategies and safe transfers  Patient educated to use call light for assistance  Vocalized understanding of assistance of staff for ambulation    Goal: Will remain free from falls  Intervention: Implement fall precautions  Bed alarm in use, bed in lowest locked position, with call light and personal belongings within reach      Problem: Fluid Volume:  Goal: Will maintain balanced intake and output  BP monitored post op, MAP <65, MD notified orders received for 500ml bolus

## 2017-06-15 NOTE — ED PROVIDER NOTES
"CHIEF COMPLAINT  Chief Complaint   Patient presents with   • Nephrolithiasis     right flank pain  seen at urgent care on [place] and told to come here       HPI  Es Evans is a 56 y.o. female with a history of kidney stones who presents with flank pain on the right side that has been progressive over the past several weeks however worse over the past 2 or so days. Was seen by Dr. Silva from urology and he ordered an outpatient CT examination for further evaluation today. The CT results were reviewed by myself and it was showing a ureteral stone at 12 mm dimension with moderate hydronephrosis.    Upon arrival here in the emergency department, she does note fevers at home. No active vomiting. No diarrhea. Reports that it feels similar to prior kidney stones in the past. Denies any chest pain or shortness of breath.    REVIEW OF SYSTEMS  See HPI for further details. All other systems are negative.     PAST MEDICAL HISTORY   has a past medical history of Kidney stone.    SOCIAL HISTORY  Social History     Social History Main Topics   • Smoking status: Never Smoker    • Smokeless tobacco: Not on file   • Alcohol Use: No   • Drug Use: No   • Sexual Activity: Not on file       SURGICAL HISTORY   has past surgical history that includes lithotripsy.    CURRENT MEDICATIONS  Home Medications     **Home medications have not yet been reviewed for this encounter**          ALLERGIES  No Known Allergies    PHYSICAL EXAM  VITAL SIGNS: BP 99/55 mmHg  Pulse 101  Temp(Src) 37.7 °C (99.9 °F)  Resp 18  Ht 1.626 m (5' 4\")  Wt 69.3 kg (152 lb 12.5 oz)  BMI 26.21 kg/m2  Pulse ox interpretation: I interpret this pulse ox as normal.  Constitutional: Alert in no apparent distress.  HENT: No signs of trauma, Bilateral external ears normal, Nose normal.   Cardiovascular: Tachycardic rate and rhythm  Thorax & Lungs: Normal breath sounds, No respiratory distress, No wheezing, No chest tenderness.   Abdomen: Bowel sounds normal, Soft, " mild right abdominal tenderness, No masses, No pulsatile masses. No peritoneal signs.  Skin: Warm, Dry, No erythema, No rash.   Back: No bony tenderness, right CVA tenderness.   Extremities: Intact distal pulses, No edema, No tenderness, No cyanosis  Musculoskeletal: Good range of motion in all major joints. No tenderness to palpation or major deformities noted.   Neurologic: Alert , Normal motor function and gait, Normal sensory function, No focal deficits noted.   Psychiatric: Affect normal, Judgment normal, Mood normal.       DIAGNOSTIC STUDIES / PROCEDURES      LABS  Labs Reviewed   CBC WITH DIFFERENTIAL - Abnormal; Notable for the following:     WBC 15.0 (*)     RBC 4.10 (*)     Hematocrit 36.9 (*)     MPV 8.6 (*)     Neutrophils-Polys 81.70 (*)     Lymphocytes 8.30 (*)     Neutrophils (Absolute) 12.30 (*)     Monos (Absolute) 1.41 (*)     All other components within normal limits    Narrative:     Indicate which anticoagulants the patient is on:->NONE   COMP METABOLIC PANEL - Abnormal; Notable for the following:     Sodium 132 (*)     Potassium 3.5 (*)     Glucose 169 (*)     All other components within normal limits    Narrative:     Indicate which anticoagulants the patient is on:->NONE   URINALYSIS,CULTURE IF INDICATED - Abnormal; Notable for the following:     Character Cloudy (*)     Protein 30 (*)     Leukocyte Esterase Moderate (*)     Occult Blood Moderate (*)     All other components within normal limits   PROTHROMBIN TIME - Abnormal; Notable for the following:     PT 15.0 (*)     INR 1.20 (*)     All other components within normal limits    Narrative:     Indicate which anticoagulants the patient is on:->NONE   ESTIMATED GFR - Abnormal; Notable for the following:     GFR If  52 (*)     GFR If Non  43 (*)     All other components within normal limits    Narrative:     Indicate which anticoagulants the patient is on:->NONE   URINE MICROSCOPIC (W/UA) - Abnormal; Notable  "for the following:     WBC 20-50 (*)     RBC 2-5 (*)     Bacteria Moderate (*)     All other components within normal limits   APTT - Abnormal; Notable for the following:     APTT 37.0 (*)     All other components within normal limits    Narrative:     If not done within the last 4 hours  Indicate which anticoagulants the patient is on:->NONE  Prior to starting hydrocortisone   URINE CULTURE(NEW)   BLOOD CULTURE    Narrative:     Per Hospital Policy: Only change Specimen Src: to \"Line\" if  specified by physician order.   BLOOD CULTURE    Narrative:     Per Hospital Policy: Only change Specimen Src: to \"Line\" if  specified by physician order.   LACTIC ACID   TSH    Narrative:     If not done within the last 4 hours  Indicate which anticoagulants the patient is on:->NONE  Prior to starting hydrocortisone   TROPONIN    Narrative:     Every 6 hours for 3 tests   CORTISOL    Narrative:     If not done within the last 4 hours  Indicate which anticoagulants the patient is on:->NONE  Prior to starting hydrocortisone   MAGNESIUM    Narrative:     Every 6 hours for 3 tests   PHOSPHORUS    Narrative:     Every 6 hours for 3 tests     RADIOLOGY  DX-PORTABLE FLUOROSCOPY < 1 HOUR Is the patient pregnant?: No    (Results Pending)       COURSE & MEDICAL DECISION MAKING  Pertinent Labs & Imaging studies reviewed. (See chart for details)  56 y.o. female with a history of nephrolithiasis presenting with right sided abdominal/flank pain with associated fevers. Was febrile here upon arrival with tachycardia. Presence of leukocytosis with catheterized urinalysis concerning for possible urinary tract infection.    Patient was sent from Dr. Silva office after CT examination. He was contacted again regarding this patient given the abnormal findings. Patient was started on IV ceftriaxone. Sepsis protocol was initiated.    Spoke with Dr. Silva from urology at 7:40 PM and he agrees to take the patient to the operating room for likely stent " placement.     Requesting admission to the hospitalist service for further management of her sepsis. Spoke with Dr. Mustafa who agrees to the admission.    The total critical care time on this patient is 40 minutes, resuscitating patient, speaking with admitting physician, and deciphering test results. This 40 minutes is exclusive of separately billable procedures.      FINAL IMPRESSION  Right nephrolithiasis  Right hydronephrosis  Sepsis  Urinary tract infection        Electronically signed by: Gregory Cheney, 6/14/2017 6:45 PM

## 2017-06-15 NOTE — OP REPORT
DATE OF SERVICE:  06/14/2017    DESCRIPTION OF PROCEDURE:  After the patient was properly identified, the labs   reviewed, consent was verified, the H and P updated.  Plan discussed with the   patient and the operative team.  The patient expressed verbal understanding   of procedure and desired to proceed and had no further questions via the   .  She was then taken to the operative theater, placed in supine   position where general anesthesia was introduced.  She was moved to dorsal   lithotomy position, care being taken to pad all pressure points and avoid any   perturbations of joints that may cause injury and this was maintained   throughout the procedure.  We began the procedure after prepping and draping   with a surgical out.  Antibiotics were previously given in the ED with proper   patient, site, and procedure was identified.  The procedure was then begun   with the 21-Barbadian sheath per urethra into the bladder.  We then identified   the right UO, placed a wire up past the stone into the kidney.  The stent was   placed over the wire uneventfully with good coil below and above the bladder   was emptied.  A 16-Barbadian Knight placed.  There was no pus just effluent of   clear, mildly turbid urine through the stent.  The patient tolerated the   procedure well and was taken to PACU in stable condition.       ____________________________________     MD JONATHAN Rothman / MINNA    DD:  06/14/2017 21:36:35  DT:  06/14/2017 22:02:13    D#:  [id number]  Job#:  [id number]

## 2017-06-16 LAB
ALBUMIN SERPL BCP-MCNC: 3.1 G/DL (ref 3.2–4.9)
ALBUMIN/GLOB SERPL: 1 G/DL
ALP SERPL-CCNC: 64 U/L (ref 30–99)
ALT SERPL-CCNC: 30 U/L (ref 2–50)
ANION GAP SERPL CALC-SCNC: 6 MMOL/L (ref 0–11.9)
AST SERPL-CCNC: 33 U/L (ref 12–45)
BASOPHILS # BLD AUTO: 0.2 % (ref 0–1.8)
BASOPHILS # BLD: 0.02 K/UL (ref 0–0.12)
BILIRUB SERPL-MCNC: 0.5 MG/DL (ref 0.1–1.5)
BUN SERPL-MCNC: 12 MG/DL (ref 8–22)
CALCIUM SERPL-MCNC: 8.3 MG/DL (ref 8.4–10.2)
CHLORIDE SERPL-SCNC: 111 MMOL/L (ref 96–112)
CO2 SERPL-SCNC: 24 MMOL/L (ref 20–33)
CREAT SERPL-MCNC: 0.9 MG/DL (ref 0.5–1.4)
EOSINOPHIL # BLD AUTO: 0.06 K/UL (ref 0–0.51)
EOSINOPHIL NFR BLD: 0.5 % (ref 0–6.9)
ERYTHROCYTE [DISTWIDTH] IN BLOOD BY AUTOMATED COUNT: 47.2 FL (ref 35.9–50)
GFR SERPL CREATININE-BSD FRML MDRD: >60 ML/MIN/1.73 M 2
GLOBULIN SER CALC-MCNC: 3.2 G/DL (ref 1.9–3.5)
GLUCOSE SERPL-MCNC: 117 MG/DL (ref 65–99)
HCT VFR BLD AUTO: 33.7 % (ref 37–47)
HGB BLD-MCNC: 11.2 G/DL (ref 12–16)
IMM GRANULOCYTES # BLD AUTO: 0.08 K/UL (ref 0–0.11)
IMM GRANULOCYTES NFR BLD AUTO: 0.6 % (ref 0–0.9)
LYMPHOCYTES # BLD AUTO: 1.64 K/UL (ref 1–4.8)
LYMPHOCYTES NFR BLD: 12.6 % (ref 22–41)
MCH RBC QN AUTO: 30.3 PG (ref 27–33)
MCHC RBC AUTO-ENTMCNC: 33.2 G/DL (ref 33.6–35)
MCV RBC AUTO: 91.1 FL (ref 81.4–97.8)
MONOCYTES # BLD AUTO: 1.37 K/UL (ref 0–0.85)
MONOCYTES NFR BLD AUTO: 10.5 % (ref 0–13.4)
NEUTROPHILS # BLD AUTO: 9.87 K/UL (ref 2–7.15)
NEUTROPHILS NFR BLD: 75.6 % (ref 44–72)
NRBC # BLD AUTO: 0 K/UL
NRBC BLD AUTO-RTO: 0 /100 WBC
PLATELET # BLD AUTO: 217 K/UL (ref 164–446)
PMV BLD AUTO: 9.1 FL (ref 9–12.9)
POTASSIUM SERPL-SCNC: 4.2 MMOL/L (ref 3.6–5.5)
PROT SERPL-MCNC: 6.3 G/DL (ref 6–8.2)
RBC # BLD AUTO: 3.7 M/UL (ref 4.2–5.4)
SODIUM SERPL-SCNC: 141 MMOL/L (ref 135–145)
WBC # BLD AUTO: 13 K/UL (ref 4.8–10.8)

## 2017-06-16 PROCEDURE — 87086 URINE CULTURE/COLONY COUNT: CPT

## 2017-06-16 PROCEDURE — A9270 NON-COVERED ITEM OR SERVICE: HCPCS | Performed by: HOSPITALIST

## 2017-06-16 PROCEDURE — 770006 HCHG ROOM/CARE - MED/SURG/GYN SEMI*

## 2017-06-16 PROCEDURE — 99232 SBSQ HOSP IP/OBS MODERATE 35: CPT | Performed by: INTERNAL MEDICINE

## 2017-06-16 PROCEDURE — 700102 HCHG RX REV CODE 250 W/ 637 OVERRIDE(OP): Performed by: HOSPITALIST

## 2017-06-16 PROCEDURE — 85025 COMPLETE CBC W/AUTO DIFF WBC: CPT

## 2017-06-16 PROCEDURE — 36415 COLL VENOUS BLD VENIPUNCTURE: CPT

## 2017-06-16 PROCEDURE — 700111 HCHG RX REV CODE 636 W/ 250 OVERRIDE (IP): Performed by: HOSPITALIST

## 2017-06-16 PROCEDURE — 700105 HCHG RX REV CODE 258: Performed by: HOSPITALIST

## 2017-06-16 PROCEDURE — 700111 HCHG RX REV CODE 636 W/ 250 OVERRIDE (IP): Performed by: UROLOGY

## 2017-06-16 PROCEDURE — 80053 COMPREHEN METABOLIC PANEL: CPT

## 2017-06-16 PROCEDURE — 700105 HCHG RX REV CODE 258: Performed by: UROLOGY

## 2017-06-16 RX ADMIN — HEPARIN SODIUM 5000 UNITS: 5000 INJECTION, SOLUTION INTRAVENOUS; SUBCUTANEOUS at 23:30

## 2017-06-16 RX ADMIN — HEPARIN SODIUM 5000 UNITS: 5000 INJECTION, SOLUTION INTRAVENOUS; SUBCUTANEOUS at 05:03

## 2017-06-16 RX ADMIN — Medication 2 TABLET: at 20:35

## 2017-06-16 RX ADMIN — PIPERACILLIN AND TAZOBACTAM 3.38 G: 3; .375 INJECTION, POWDER, FOR SOLUTION INTRAVENOUS at 17:12

## 2017-06-16 RX ADMIN — POTASSIUM CHLORIDE 20 MEQ: 1500 TABLET, EXTENDED RELEASE ORAL at 10:55

## 2017-06-16 RX ADMIN — ACETAMINOPHEN 650 MG: 325 TABLET, FILM COATED ORAL at 17:04

## 2017-06-16 RX ADMIN — OXYCODONE HYDROCHLORIDE 5 MG: 5 TABLET ORAL at 11:43

## 2017-06-16 RX ADMIN — POTASSIUM CHLORIDE 20 MEQ: 1500 TABLET, EXTENDED RELEASE ORAL at 20:35

## 2017-06-16 RX ADMIN — GENTAMICIN SULFATE 280 MG: 40 INJECTION, SOLUTION INTRAMUSCULAR; INTRAVENOUS at 17:05

## 2017-06-16 RX ADMIN — PIPERACILLIN AND TAZOBACTAM 3.38 G: 3; .375 INJECTION, POWDER, FOR SOLUTION INTRAVENOUS at 05:04

## 2017-06-16 RX ADMIN — PIPERACILLIN AND TAZOBACTAM 3.38 G: 3; .375 INJECTION, POWDER, FOR SOLUTION INTRAVENOUS at 11:44

## 2017-06-16 RX ADMIN — ACETAMINOPHEN 650 MG: 325 TABLET, FILM COATED ORAL at 05:03

## 2017-06-16 ASSESSMENT — ENCOUNTER SYMPTOMS
HEARTBURN: 0
HEADACHES: 0
FEVER: 0
SHORTNESS OF BREATH: 0
FLANK PAIN: 1

## 2017-06-16 ASSESSMENT — PAIN SCALES - GENERAL
PAINLEVEL_OUTOF10: 4
PAINLEVEL_OUTOF10: 4
PAINLEVEL_OUTOF10: 5

## 2017-06-16 NOTE — PROGRESS NOTES
"Urology Progress Note    Post op Day # 2Cystoscopy and right stent placement    Overnight Events: None    S: No fevers, chills, nausea or vomiting.  C/o bladder spasm, otherwise feeling better.  UC pending on day 2 Zosyn.    O:   Blood pressure 116/57, pulse 65, temperature 37.2 °C (98.9 °F), resp. rate 18, height 1.626 m (5' 4\"), weight 69.3 kg (152 lb 12.5 oz), SpO2 96 %, not currently breastfeeding.  Recent Labs      06/14/17   1855  06/15/17   0320  06/16/17   0439   SODIUM  132*  139  141   POTASSIUM  3.5*  4.0  4.2   CHLORIDE  100  111  111   CO2  21  20  24   GLUCOSE  169*  168*  117*   BUN  20  16  12   CREATININE  1.27  0.89  0.90   CALCIUM  8.5  7.8*  8.3*     Recent Labs      06/14/17   1855  06/15/17   0320  06/16/17   0439   WBC  15.0*  13.9*  13.0*   RBC  4.10*  3.71*  3.70*   HEMOGLOBIN  12.6  11.3*  11.2*   HEMATOCRIT  36.9*  33.8*  33.7*   MCV  90.0  91.1  91.1   MCH  30.7  30.5  30.3   MCHC  34.1  33.4*  33.2*   RDW  44.5  46.5  47.2   PLATELETCT  244  204  217   MPV  8.6*  8.7*  9.1         Intake/Output Summary (Last 24 hours) at 06/16/17 0954  Last data filed at 06/16/17 0900   Gross per 24 hour   Intake   1494 ml   Output   3400 ml   Net  -1906 ml       Exam:  Abdomen soft, benign.     Urine: clear with wyatt      A/P:    Active Hospital Problems    Diagnosis   • Sepsis (CMS-HCC) [A41.9]     Priority: High   • Hyponatremia [E87.1]     Priority: Medium     Likely secondary poor oral intake.   Serum sodium improved with iv fluid. Continue to monitor.     • Right nephrolithiasis [N20.0]     Priority: Medium   • Hydronephrosis [N13.30]     Priority: Medium   • Hypokalemia [E87.6]     Level improved after replacement. Continue to monitor.     • Nausea & vomiting [R11.2]       PLAN:   1.  Ambulate, IS.  2.  DC wyatt  3.  Abx per hospital team and culture results  4.  We are not going to treat her stone during this hospital stay.  Once medically cleared, she will need two weeks of appropriate abx, " flomax and pain management.    5.  Our office will make f/u appointment with Dr Silva in 2-3 weeks to treat her stone and remove stent, outpatient.  6.  Pt understands the importance of f/u.  She understands that she has a stent and large stone and both need to come out.

## 2017-06-16 NOTE — ASSESSMENT & PLAN NOTE
Continue iv pain medication prn.   S/p cystoscopy and right stent placement.  Stone will not be removed until the infection is controlled per urology.  Tentative plan is elective surgery after 2 weeks of antibiotics treatment.

## 2017-06-16 NOTE — CARE PLAN
Problem: Safety  Goal: Will remain free from injury  Outcome: PROGRESSING AS EXPECTED  Bed in low position, treaded slipper socks on, and call light in reach. Pt instructed to call nurse if ambulating out of bed.         Problem: Venous Thromboembolism (VTW)/Deep Vein Thrombosis (DVT) Prevention:  Goal: Patient will participate in Venous Thrombosis (VTE)/Deep Vein Thrombosis (DVT)Prevention Measures  Outcome: PROGRESSING AS EXPECTED    06/16/17 0715   OTHER   Risk Assessment Score 1   VTE RISK Moderate   Mechanical Prophylaxis SCDs, Sequentials (Intermittent Pneumatic Compression Devices)   Pharmacologic Prophylaxis Used Unfractionated Heparin

## 2017-06-16 NOTE — ASSESSMENT & PLAN NOTE
Sepsis due to complicated UTI.  Preliminary urine culture result indicates enterococcus  Group D.  Gentamicin added.

## 2017-06-16 NOTE — CARE PLAN
"Problem: Pain Management  Goal: Pain level will decrease to patient’s comfort goal  Patient reports right flank pain and lower abdominal pain  Dr Peña notified and orders received for Oxy 5, pt reports \"feeling better\"  Intervention: Educate and implement non-pharmacologic comfort measures. Examples: relaxation, distration, play therapy, activity therapy, massage, etc.  Patient repositioned for further comfort          "

## 2017-06-16 NOTE — DISCHARGE PLANNING
Medical Social Work    Referral: Pt discussed at IDT rounds this AM.    Intervention: Per flowsheet, pt lives with spouse and expects to d.c home.  Pt is currently on 2L O2.  No SS needs identified nor any requests for NATHAN Polo during rounds.      Plan: SW available for any assistance with d.c planning.

## 2017-06-16 NOTE — PROGRESS NOTES
[facility]    Date of Service: 6/15/2017    Chief Complaint  56 y.o. female admitted 2017 with right flank pain, nausea and vomiting.    Interval Problem Update  6/15/17:  Status post ureter stent insertion.  The patient states that she is feeling better than yesterday and no long nauseous.    Consultants/Specialty  Urologist, Dr. Asad Eldridge.    Disposition  Home with home health        Review of Systems   Constitutional: Negative for fever.   Respiratory: Negative for shortness of breath.    Cardiovascular: Negative for chest pain.   Gastrointestinal: Positive for abdominal pain. Negative for heartburn.   Genitourinary: Positive for flank pain.   Neurological: Negative for headaches.      Physical Exam  Laboratory/Imaging   Hemodynamics  Temp (24hrs), Av.8 °C (100.1 °F), Min:36.4 °C (97.5 °F), Max:39.6 °C (103.3 °F)   Temperature: 37.7 °C (99.8 °F)  Pulse  Av  Min: 53  Max: 101 Heart Rate (Monitored): 95  Blood Pressure: 134/60 mmHg, NIBP: 104/63 mmHg      Respiratory      Respiration: 18, Pulse Oximetry: 98 %, O2 Daily Delivery Respiratory : Nasal Cannula             Fluids    Intake/Output Summary (Last 24 hours) at 06/15/17 1854  Last data filed at 06/15/17 1600   Gross per 24 hour   Intake   3147 ml   Output   1380 ml   Net   1767 ml       Nutrition  Orders Placed This Encounter   Procedures   • Diet Order     Standing Status: Standing      Number of Occurrences: 1      Standing Expiration Date:      Order Specific Question:  Diet:     Answer:  Regular [1]     Physical Exam   Constitutional: She is oriented to person, place, and time. No distress.   HENT:   Mouth/Throat: Oropharynx is clear and moist. No oropharyngeal exudate.   Eyes: Pupils are equal, round, and reactive to light. Right eye exhibits no discharge. Left eye exhibits no discharge.   Neck: Normal range of motion. Neck supple. No JVD present.   Cardiovascular: Normal rate and regular rhythm.    No murmur  heard.  Pulmonary/Chest: Effort normal and breath sounds normal. No respiratory distress. She has no wheezes.   Abdominal: Soft. Bowel sounds are normal. She exhibits no distension.   Tenderness to palpation of RLQ   Genitourinary:   Right flank tender to palpation   Musculoskeletal: Normal range of motion. She exhibits no edema.   Neurological: She is alert and oriented to person, place, and time. No cranial nerve deficit.   Skin: Skin is warm and dry. She is not diaphoretic.   Psychiatric: She has a normal mood and affect. Thought content normal.       Recent Labs      06/14/17   1855  06/15/17   0320   WBC  15.0*  13.9*   RBC  4.10*  3.71*   HEMOGLOBIN  12.6  11.3*   HEMATOCRIT  36.9*  33.8*   MCV  90.0  91.1   MCH  30.7  30.5   MCHC  34.1  33.4*   RDW  44.5  46.5   PLATELETCT  244  204   MPV  8.6*  8.7*     Recent Labs      06/14/17   1855  06/15/17   0320   SODIUM  132*  139   POTASSIUM  3.5*  4.0   CHLORIDE  100  111   CO2  21  20   GLUCOSE  169*  168*   BUN  20  16   CREATININE  1.27  0.89   CALCIUM  8.5  7.8*     Recent Labs      06/14/17 1855   APTT  37.0*   INR  1.20*                  Assessment/Plan     * Sepsis (CMS-[facility])  Assessment & Plan  Sepsis due to complicated UTI.  Cultures collected. Follow up results.  Clinically the patient improved.   Hypotension resolved.  Continue empiric antibiotic and hydration.    Hydronephrosis (present on admission)  Assessment & Plan  Status post stent. Monitor renal function    Right nephrolithiasis  Assessment & Plan  Continue iv pain medication prn.   S/p cystoscopy and right stent placement.  Stone will not be removed until the infection is controlled.    Nausea & vomiting (present on admission)  Assessment & Plan  Antiemetic prn nausea or vomiting              DVT Prophylaxis: Heparin

## 2017-06-16 NOTE — PROGRESS NOTES
Patient  groaning, c/o pain in her right flank and lower abdomen, she can not tolerate anymore, has not subsided with tylMD marian paged for pain management orders

## 2017-06-16 NOTE — PROGRESS NOTES
"Pharmacy Kinetics Gentamicin Day # 1  2017    56 y.o. female    Estimated CrCl =  Estimated Creatinine Clearance: 65.1 mL/min (by C-G formula based on Cr of 0.9).     Recent Labs      17   1855  06/15/17   0320  17   0439   WBC  15.0*  13.9*  13.0*   NEUTSPOLYS  81.70*   --   75.60*   BUN  20  16  12   CREATININE  1.27  0.89  0.90        Blood pressure 116/57, pulse 65, temperature 37.2 °C (98.9 °F), resp. rate 18, height 1.626 m (5' 4\"), weight 69.3 kg (152 lb 12.5 oz), SpO2 96 %, not currently breastfeeding.    Temp (24hrs), Av.3 °C (99.1 °F), Min:36.9 °C (98.4 °F), Max:37.7 °C (99.8 °F)            A/P 1.  Will start Gentamicin 280 mg IVPB q 24 hrs for UTI   2.  Check gentamicin trough prior to gentamicin dose on .              3.  Will review trough when available from the lab at [facility]        "

## 2017-06-17 VITALS
DIASTOLIC BLOOD PRESSURE: 65 MMHG | HEIGHT: 64 IN | RESPIRATION RATE: 18 BRPM | BODY MASS INDEX: 26.08 KG/M2 | HEART RATE: 59 BPM | TEMPERATURE: 98.5 F | WEIGHT: 152.78 LBS | SYSTOLIC BLOOD PRESSURE: 142 MMHG | OXYGEN SATURATION: 95 %

## 2017-06-17 PROBLEM — N39.0 COMPLICATED UTI (URINARY TRACT INFECTION): Status: ACTIVE | Noted: 2017-06-17

## 2017-06-17 PROBLEM — E87.6 HYPOKALEMIA: Status: RESOLVED | Noted: 2017-06-15 | Resolved: 2017-06-17

## 2017-06-17 PROBLEM — E87.1 HYPONATREMIA: Status: RESOLVED | Noted: 2017-06-15 | Resolved: 2017-06-17

## 2017-06-17 PROBLEM — A41.9 SEPSIS, UNSPECIFIED: Status: RESOLVED | Noted: 2017-06-15 | Resolved: 2017-06-17

## 2017-06-17 LAB
ANION GAP SERPL CALC-SCNC: 9 MMOL/L (ref 0–11.9)
BACTERIA UR CULT: ABNORMAL
BACTERIA UR CULT: ABNORMAL
BASOPHILS # BLD AUTO: 0.2 % (ref 0–1.8)
BASOPHILS # BLD: 0.02 K/UL (ref 0–0.12)
BUN SERPL-MCNC: 8 MG/DL (ref 8–22)
CALCIUM SERPL-MCNC: 8.6 MG/DL (ref 8.4–10.2)
CHLORIDE SERPL-SCNC: 108 MMOL/L (ref 96–112)
CO2 SERPL-SCNC: 24 MMOL/L (ref 20–33)
CREAT SERPL-MCNC: 0.69 MG/DL (ref 0.5–1.4)
EOSINOPHIL # BLD AUTO: 0.13 K/UL (ref 0–0.51)
EOSINOPHIL NFR BLD: 1.5 % (ref 0–6.9)
ERYTHROCYTE [DISTWIDTH] IN BLOOD BY AUTOMATED COUNT: 46.5 FL (ref 35.9–50)
GFR SERPL CREATININE-BSD FRML MDRD: >60 ML/MIN/1.73 M 2
GLUCOSE SERPL-MCNC: 109 MG/DL (ref 65–99)
HCT VFR BLD AUTO: 33.1 % (ref 37–47)
HGB BLD-MCNC: 11.2 G/DL (ref 12–16)
IMM GRANULOCYTES # BLD AUTO: 0.04 K/UL (ref 0–0.11)
IMM GRANULOCYTES NFR BLD AUTO: 0.4 % (ref 0–0.9)
LYMPHOCYTES # BLD AUTO: 1.79 K/UL (ref 1–4.8)
LYMPHOCYTES NFR BLD: 20.1 % (ref 22–41)
MCH RBC QN AUTO: 30.8 PG (ref 27–33)
MCHC RBC AUTO-ENTMCNC: 33.8 G/DL (ref 33.6–35)
MCV RBC AUTO: 90.9 FL (ref 81.4–97.8)
MONOCYTES # BLD AUTO: 1.02 K/UL (ref 0–0.85)
MONOCYTES NFR BLD AUTO: 11.5 % (ref 0–13.4)
NEUTROPHILS # BLD AUTO: 5.9 K/UL (ref 2–7.15)
NEUTROPHILS NFR BLD: 66.3 % (ref 44–72)
NRBC # BLD AUTO: 0 K/UL
NRBC BLD AUTO-RTO: 0 /100 WBC
PLATELET # BLD AUTO: 243 K/UL (ref 164–446)
PMV BLD AUTO: 9 FL (ref 9–12.9)
POTASSIUM SERPL-SCNC: 4 MMOL/L (ref 3.6–5.5)
RBC # BLD AUTO: 3.64 M/UL (ref 4.2–5.4)
SIGNIFICANT IND 70042: ABNORMAL
SITE SITE: ABNORMAL
SODIUM SERPL-SCNC: 141 MMOL/L (ref 135–145)
SOURCE SOURCE: ABNORMAL
WBC # BLD AUTO: 8.9 K/UL (ref 4.8–10.8)

## 2017-06-17 PROCEDURE — A9270 NON-COVERED ITEM OR SERVICE: HCPCS | Performed by: HOSPITALIST

## 2017-06-17 PROCEDURE — 85025 COMPLETE CBC W/AUTO DIFF WBC: CPT

## 2017-06-17 PROCEDURE — 700105 HCHG RX REV CODE 258: Performed by: UROLOGY

## 2017-06-17 PROCEDURE — 36415 COLL VENOUS BLD VENIPUNCTURE: CPT

## 2017-06-17 PROCEDURE — 700102 HCHG RX REV CODE 250 W/ 637 OVERRIDE(OP): Performed by: HOSPITALIST

## 2017-06-17 PROCEDURE — 80048 BASIC METABOLIC PNL TOTAL CA: CPT

## 2017-06-17 PROCEDURE — 99239 HOSP IP/OBS DSCHRG MGMT >30: CPT | Performed by: INTERNAL MEDICINE

## 2017-06-17 PROCEDURE — 700111 HCHG RX REV CODE 636 W/ 250 OVERRIDE (IP): Performed by: UROLOGY

## 2017-06-17 PROCEDURE — 700111 HCHG RX REV CODE 636 W/ 250 OVERRIDE (IP): Performed by: HOSPITALIST

## 2017-06-17 PROCEDURE — 700105 HCHG RX REV CODE 258: Performed by: HOSPITALIST

## 2017-06-17 RX ORDER — ONDANSETRON 4 MG/1
4 TABLET, ORALLY DISINTEGRATING ORAL EVERY 6 HOURS PRN
Qty: 20 TAB | Refills: 0 | Status: SHIPPED | OUTPATIENT
Start: 2017-06-17

## 2017-06-17 RX ORDER — HYDROCODONE BITARTRATE AND ACETAMINOPHEN 5; 325 MG/1; MG/1
1-2 TABLET ORAL EVERY 4 HOURS PRN
Qty: 25 TAB | Refills: 0 | Status: SHIPPED | OUTPATIENT
Start: 2017-06-17 | End: 2017-06-24

## 2017-06-17 RX ORDER — NITROFURANTOIN MACROCRYSTALS 100 MG/1
100 CAPSULE ORAL 4 TIMES DAILY
Qty: 28 CAP | Refills: 1 | Status: SHIPPED | OUTPATIENT
Start: 2017-06-17 | End: 2017-06-24

## 2017-06-17 RX ADMIN — HEPARIN SODIUM 5000 UNITS: 5000 INJECTION, SOLUTION INTRAVENOUS; SUBCUTANEOUS at 05:54

## 2017-06-17 RX ADMIN — PIPERACILLIN AND TAZOBACTAM 3.38 G: 3; .375 INJECTION, POWDER, FOR SOLUTION INTRAVENOUS at 06:00

## 2017-06-17 RX ADMIN — PIPERACILLIN AND TAZOBACTAM 3.38 G: 3; .375 INJECTION, POWDER, FOR SOLUTION INTRAVENOUS at 01:15

## 2017-06-17 RX ADMIN — GENTAMICIN SULFATE 280 MG: 40 INJECTION, SOLUTION INTRAMUSCULAR; INTRAVENOUS at 15:11

## 2017-06-17 RX ADMIN — POTASSIUM CHLORIDE 20 MEQ: 1500 TABLET, EXTENDED RELEASE ORAL at 09:53

## 2017-06-17 RX ADMIN — HEPARIN SODIUM 5000 UNITS: 5000 INJECTION, SOLUTION INTRAVENOUS; SUBCUTANEOUS at 14:07

## 2017-06-17 RX ADMIN — PIPERACILLIN AND TAZOBACTAM 3.38 G: 3; .375 INJECTION, POWDER, FOR SOLUTION INTRAVENOUS at 12:09

## 2017-06-17 ASSESSMENT — PAIN SCALES - GENERAL: PAINLEVEL_OUTOF10: 0

## 2017-06-17 NOTE — PROGRESS NOTES
"Pharmacy Kinetics Gentamicin Day # 2  2017    Currently on Gentamicin 280 mg iv q24hr    Estimated CrCl =  Estimated Creatinine Clearance: 84.9 mL/min (by C-G formula based on Cr of 0.69).     Recent Labs      17   1855  06/15/17   0320  17   0439  17   0552   WBC  15.0*  13.9*  13.0*  8.9   NEUTSPOLYS  81.70*   --   75.60*  66.30   BUN  20  16  12  8   CREATININE  1.27  0.89  0.90  0.69        Blood pressure 114/51, pulse 68, temperature 37.2 °C (98.9 °F), resp. rate 19, height 1.626 m (5' 4\"), weight 69.3 kg (152 lb 12.5 oz), SpO2 93 %, not currently breastfeeding.    Temp (24hrs), Av °C (98.6 °F), Min:36.8 °C (98.3 °F), Max:37.2 °C (98.9 °F)        A/P 1.  UTI with Grp D Enterococcus sp.              2. ABX: zosyn 3.375 Gm IV every 6 hours and gentamicin              3.  Gentamicin trough level prior to 1500 hour dose today, which is sent to regional's lab                   And not available until tomorrow- will evaluate when available.    [place] Burt [facility]                              "

## 2017-06-17 NOTE — CARE PLAN
Problem: Safety  Goal: Will remain free from injury  Outcome: PROGRESSING AS EXPECTED  Pt is injury-free at this moment   Fall precautions in place. Bed locked. Bed at lowest position.  Call light and personal belongings within reach.   Encouraged pt to call for any assistance.  Continue to monitor     Problem: Venous Thromboembolism (VTW)/Deep Vein Thrombosis (DVT) Prevention:  Goal: Patient will participate in Venous Thrombosis (VTE)/Deep Vein Thrombosis (DVT)Prevention Measures  Outcome: PROGRESSING AS EXPECTED  Pt currently receiving scheduled Heparin for DVT/VTE prevention & SCDs.

## 2017-06-17 NOTE — DISCHARGE PLANNING
Medical Social Work    Referral: Pt discussed at IDT rounds this AM.    Intervention: Per flowsheet, pt lives with spouse and expects to d.c home.  No SS needs identified nor any requests for NATHAN Polo during rounds.      Plan: SW available for any assistance with d.c planning.

## 2017-06-17 NOTE — PROGRESS NOTES
Received report from day shift nurse. Assumed pt care at 1915. Pt is A&Ox4, resting comfortably in bed with family at bedside. Pt on r.a. No signs of SOB/respiratory distress noted. Assessment completed, Labs noted, VSS. Pt c/o no pain at this moment. Fall precautions in place. Bed locked. Bed at lowest position. Call light and personal belongings within reach. Encouraged pt to call for any assistance. Continue to monitor

## 2017-06-17 NOTE — PROGRESS NOTES
[facility]    Date of Service: 2017    Chief Complaint  56 y.o. female admitted 2017 with right flank pain, nausea and vomiting.    Interval Problem Update  6/15/17:  Status post ureter stent insertion.  The patient states that she is feeling better than yesterday and no long nauseous.    Consultants/Specialty  Urologist, Dr. Asad Eldridge.    Disposition  Home with home health        Review of Systems   Constitutional: Negative for fever.   Respiratory: Negative for shortness of breath.    Cardiovascular: Negative for chest pain.   Gastrointestinal: Negative for heartburn.   Genitourinary: Positive for flank pain.   Neurological: Negative for headaches.      Physical Exam  Laboratory/Imaging   Hemodynamics  Temp (24hrs), Av.3 °C (99.1 °F), Min:36.9 °C (98.4 °F), Max:37.7 °C (99.8 °F)   Temperature: 37 °C (98.6 °F)  Pulse  Av.2  Min: 53  Max: 101    Blood Pressure: 125/68 mmHg      Respiratory      Respiration: 18, Pulse Oximetry: 98 %             Fluids    Intake/Output Summary (Last 24 hours) at 17 1843  Last data filed at 17 1533   Gross per 24 hour   Intake   1974 ml   Output   4050 ml   Net  -2076 ml       Nutrition  Orders Placed This Encounter   Procedures   • Diet Order     Standing Status: Standing      Number of Occurrences: 1      Standing Expiration Date:      Order Specific Question:  Diet:     Answer:  Regular [1]     Physical Exam   Constitutional: She is oriented to person, place, and time. No distress.   HENT:   Mouth/Throat: Oropharynx is clear and moist. No oropharyngeal exudate.   Eyes: Pupils are equal, round, and reactive to light. Right eye exhibits no discharge. Left eye exhibits no discharge.   Neck: Normal range of motion. Neck supple. No JVD present.   Cardiovascular: Normal rate and regular rhythm.    No murmur heard.  Pulmonary/Chest: Effort normal and breath sounds normal. No respiratory distress. She has no wheezes.   Abdominal: Soft.  Bowel sounds are normal. She exhibits no distension.   Tenderness to palpation of RLQ   Genitourinary:   Right flank tender to palpation   Musculoskeletal: Normal range of motion. She exhibits no edema.   Neurological: She is alert and oriented to person, place, and time. No cranial nerve deficit.   Skin: Skin is warm and dry. She is not diaphoretic.   Psychiatric: She has a normal mood and affect. Thought content normal.       Recent Labs      06/14/17   1855  06/15/17   0320  06/16/17   0439   WBC  15.0*  13.9*  13.0*   RBC  4.10*  3.71*  3.70*   HEMOGLOBIN  12.6  11.3*  11.2*   HEMATOCRIT  36.9*  33.8*  33.7*   MCV  90.0  91.1  91.1   MCH  30.7  30.5  30.3   MCHC  34.1  33.4*  33.2*   RDW  44.5  46.5  47.2   PLATELETCT  244  204  217   MPV  8.6*  8.7*  9.1     Recent Labs      06/14/17   1855  06/15/17   0320  06/16/17   0439   SODIUM  132*  139  141   POTASSIUM  3.5*  4.0  4.2   CHLORIDE  100  111  111   CO2  21  20  24   GLUCOSE  169*  168*  117*   BUN  20  16  12   CREATININE  1.27  0.89  0.90   CALCIUM  8.5  7.8*  8.3*     Recent Labs      06/14/17 1855   APTT  37.0*   INR  1.20*                  Assessment/Plan     * Sepsis (CMS-[facility])  Assessment & Plan  Sepsis due to complicated UTI.  Preliminary urine culture result indicates enterococcus  Group D.  Gentamicin added.     Hydronephrosis (present on admission)  Assessment & Plan  Status post stent. Renal function is stable continue to monitor.    Right nephrolithiasis  Assessment & Plan  Continue iv pain medication prn.   S/p cystoscopy and right stent placement.  Stone will not be removed until the infection is controlled per urology.  Tentative plan is elective surgery after 2 weeks of antibiotics treatment.    Hyponatremia  Assessment & Plan  Likely secondary poor oral intake serum sodium improved with iv fluid continue to monitor.    Nausea & vomiting (present on admission)  Assessment & Plan  Antiemetic prn nausea or vomiting    Hypokalemia  Assessment  & Plan  Level improved after replacement. Continue to monitor.                DVT Prophylaxis: Heparin

## 2017-06-18 LAB
BACTERIA UR CULT: NORMAL
SIGNIFICANT IND 70042: NORMAL
SITE SITE: NORMAL
SOURCE SOURCE: NORMAL

## 2017-06-18 NOTE — DISCHARGE INSTRUCTIONS
Discharge Instructions    Discharged to home by car with relative. Discharged via wheelchair, hospital escort: Yes.  Special equipment needed: Not Applicable    Be sure to schedule a follow-up appointment with your primary care doctor or any specialists as instructed.     Discharge Plan: Home  Influenza Vaccine Indication: Not indicated: Previously immunized this influenza season and > 8 years of age    I understand that a diet low in cholesterol, fat, and sodium is recommended for good health. Unless I have been given specific instructions below for another diet, I accept this instruction as my diet prescription.   Other diet: as desired  Special Instructions: Follow up with Dr. Ledezma for stent removal.      · Is patient discharged on Warfarin / Coumadin?   No     · Is patient Post Blood Transfusion?  No    Depression / Suicide Risk    As you are discharged from this [facility] facility, it is important to learn how to keep safe from harming yourself.    Recognize the warning signs:  · Abrupt changes in personality, positive or negative- including increase in energy   · Giving away possessions  · Change in eating patterns- significant weight changes-  positive or negative  · Change in sleeping patterns- unable to sleep or sleeping all the time   · Unwillingness or inability to communicate  · Depression  · Unusual sadness, discouragement and loneliness  · Talk of wanting to die  · Neglect of personal appearance   · Rebelliousness- reckless behavior  · Withdrawal from people/activities they love  · Confusion- inability to concentrate     If you or a loved one observes any of these behaviors or has concerns about self-harm, here's what you can do:  · Talk about it- your feelings and reasons for harming yourself  · Remove any means that you might use to hurt yourself (examples: pills, rope, extension cords, firearm)  · Get professional help from the community (Mental Health, Substance Abuse, psychological  counseling)  · Do not be alone:Call your Safe Contact- someone whom you trust who will be there for you.  · Call your local CRISIS HOTLINE 745-2826 or 093-121-2432  · Call your local Children's Mobile Crisis Response Team [place] (251) 131-3789 or www.[employer]  · Call the toll free National Suicide Prevention Hotlines   · National Suicide Prevention Lifeline 650-795-REUT (9802)  · National Hope Line Network 800-SUICIDE (519-6700)

## 2017-06-18 NOTE — DISCHARGE SUMMARY
DATE OF ADMISSION:  06/14/2017    DATE OF DISCHARGE:  06/17/2017    DISCHARGE DIAGNOSES:  Including the following;  1.  Complicated urinary tract infection, which grew out to be group B   enterococcus.  2.  Sepsis secondary to urinary tract infections, resolved.  3.  obstructive uropathy with a large nephrolithiasis in   the distal ureter that measured to be 1.2 cm and patient also had right kidney   hydronephrosis.  4.  Hyponatremia, resolved.  5.  Nausea and vomiting, resolved.  6.  Hypokalemia, resolved.    CONSULTANTS ON THE CASE:  Including urologist Dr. Asad Silva.    PERTINENT IMAGING AND PROCEDURES:  1.  Patient had CT scan of the renal system done and the finding of this   showed that the patient has a 12-mm stone in the distal right ureter with   associated moderate-to-severe obstructive changes, thickening and inflammation   of the right ureter noted and left kidney was unremarkable.  The patient had   a normal appendix.  2.  The patient also had microbiology testing collected including 2 sets of   blood culture negative and urine culture first set collected on 06/14/2017   grew out to be Enterococcus faecalis, which are sensitive to nitrofurantoin,   daptomycin and ampicillin.  3.  Repeat urine culture that was collected on 06/16/2017, which thus far   negative, no growth after 24 hours.  4.  Please note that the patient did have a cystoscopy and right stent   placement by Dr. Asad Silva done on 06/14/2017.    SUMMARY OF HOSPITAL COURSE:  Patient is a 56-year-old  female, who   came into the hospital with complaint of left flank pain.  Patient has   subsequently had CT scan done, which found the patient has large   nephrolithiasis causing complaint of right flank pain and patient's CT scan   done which found the patient has a large 12-mm stone causing right-sided   hydronephrosis and subsequently, urology was consulted and this patient was   taken to do the cystoscopy, patient had a right  ureteral stent placed and then   urine culture indicated urinary tract infection, so patient was started on   empiric antibiotic.  Patient does meet sepsis criteria in the beginning,   patient received fluid infusion that help stabilize patient hemodynamically   and patient had persistent elevated white blood cell count despite being on   the empiric antibiotics of Zosyn and subsequently, patient had gentamicin   added that to help and brought the patient's white blood cell count down to   normal range.  Patient was feeling much better and patient tolerated diet and   does not have much more pain and because of the severity of patient's   infection, Dr. Silva's opinion is that patient should complete 2 weeks of   antibiotics and then follow up with him for elective lithotripsy at a later   time.  Because the patient's urine culture is pending, I could not discharge   the patient as the patient's initial urine culture indicated group B   enterococcus and that could some time came back as a system organism, then   later the urine culture that finally showed that the Enterococcus   faecalis actually is sensitive to nitrofurantoin, so I have changed the   patient's antibiotic to nitrofurantoin and the patient was discharged with   oral antibiotics.    DISCHARGE CONDITION:  Stable.    DISPOSITION:  Home.    ACTIVITY:  As tolerated.    DIET:  Would be regular diet.    DISCHARGE MEDICATIONS:  Including the following nitrofurantoin 100 mg p.o.   q.i.d. for 7 days and may repeat another round if needed, but usually a 7-day   course is suggested for UTI.  Other medications patient prescribed was Norco   5/325 mg tablets 1-2 tablets every 4 hours as needed for pain for 7 days and   patient also has Zofran 4 mg 1 tablet by mouth q. 6 hours p.r.n. for nausea   and vomiting.    FOLLOWUP INSTRUCTIONS:  The patient is to follow up with primary care within 1   week.  Patient is to follow up with Dr. Asad Silva in 2 weeks for    ureteral stent removal and also for stone removal.    Total discharge time was 32 minutes.       ____________________________________     DO GASTON Ramirez / MINNA    DD:  06/18/2017 00:05:05  DT:  06/18/2017 07:29:11    D#:  [id number]  Job#:  [id number]

## 2017-06-19 LAB
BACTERIA BLD CULT: NORMAL
BACTERIA BLD CULT: NORMAL
SIGNIFICANT IND 70042: NORMAL
SIGNIFICANT IND 70042: NORMAL
SITE SITE: NORMAL
SITE SITE: NORMAL
SOURCE SOURCE: NORMAL
SOURCE SOURCE: NORMAL

## 2017-06-28 ENCOUNTER — HOSPITAL ENCOUNTER (OUTPATIENT)
Dept: RADIOLOGY | Facility: MEDICAL CENTER | Age: 57
End: 2017-06-28
Attending: UROLOGY
Payer: COMMERCIAL

## 2017-06-28 DIAGNOSIS — N20.0 URIC ACID NEPHROLITHIASIS: ICD-10-CM

## 2017-06-28 PROCEDURE — 74000 DX-ABDOMEN-1 VIEW: CPT

## 2017-06-28 NOTE — DOCUMENTATION QUERY
DOCUMENTATION QUERY    PROVIDERS: Please select “Cosign w/ note”to reply to query.    To better represent the severity of illness of your patient, please review the following information and exercise your independent professional judgment in responding to this query.     Sepsis due to enterococcus UTI  is documented in the discharge summary based upon the clinical findings, risk factors, and treatment, please clarify if the enterococcus is the causative   Organism for the sepsis    Please select all that apply:   • Sepsis present on admission (specify causative organism if known and any associated organ dysfunction if known)  • Sepsis developed after admission  • Sepsis has been ruled out  • SIRS that is unrelated to any infection  • SIRS of non-infectious origin with acute organ dysfunction  • Other explanation of clinical findings  • Findings of no clinical significance  • Unable to determine      The medical record reflects the following:   Clinical Findings  WBC 15.0, temp 99.1, blood pressure 125/68 and RR 18   Treatment  IV Zosyn   Risk Factors  obstructive uropathy and UTI   Location within medical record  {CHART LOCATION:[id number]}     Thank you,   Urvashi Pringle, AN  HIM coder

## 2017-07-06 NOTE — DOCUMENTATION QUERY
DOCUMENTATION QUERY      PROVIDERS: Please select “Cosign w/ note”to reply to query.     To better represent the severity of illness of your patient, please review the following information and exercise your independent professional judgment in responding to this query.      Sepsis due to enterococcus UTI  is documented in the discharge summary based upon the clinical findings, risk factors, and treatment, please clarify if the enterococcus is the causative    Organism for the sepsis     Please select which applies:      Sepsis is due to enterococcus  Sepsis with unknown etiology  Other explanation of clinical findings (please document)  Unable to determine.        The medical record reflects the following:    Clinical Findings   WBC 15.0, temp 99.1, blood pressure 125/68 and RR 18    Treatment   IV Zosyn    Risk Factors   obstructive uropathy and UTI    Location within medical record   Discharge Summary      Thank you,    Urvashi Pringle, CCS  HIM coder

## 2017-07-18 ENCOUNTER — HOSPITAL ENCOUNTER (OUTPATIENT)
Dept: HOSPITAL 8 - OUT | Age: 57
End: 2017-07-18
Attending: UROLOGY
Payer: COMMERCIAL

## 2017-07-18 VITALS — DIASTOLIC BLOOD PRESSURE: 77 MMHG | SYSTOLIC BLOOD PRESSURE: 131 MMHG

## 2017-07-18 VITALS — BODY MASS INDEX: 24.28 KG/M2 | HEIGHT: 64 IN | WEIGHT: 142.2 LBS

## 2017-07-18 DIAGNOSIS — E78.00: ICD-10-CM

## 2017-07-18 DIAGNOSIS — Z98.890: ICD-10-CM

## 2017-07-18 DIAGNOSIS — N13.2: Primary | ICD-10-CM

## 2017-07-18 PROCEDURE — C1769 GUIDE WIRE: HCPCS

## 2017-07-18 PROCEDURE — 52356 CYSTO/URETERO W/LITHOTRIPSY: CPT

## 2017-07-18 PROCEDURE — C1726 CATH, BAL DIL, NON-VASCULAR: HCPCS

## 2017-07-18 PROCEDURE — C1758 CATHETER, URETERAL: HCPCS

## 2017-07-18 PROCEDURE — C2617 STENT, NON-COR, TEM W/O DEL: HCPCS

## 2017-07-18 PROCEDURE — 74000: CPT

## 2017-07-18 PROCEDURE — 76001: CPT

## 2017-07-18 RX ADMIN — FENTANYL CITRATE PRN MCG: 50 INJECTION INTRAMUSCULAR; INTRAVENOUS at 16:47

## 2017-07-18 RX ADMIN — FENTANYL CITRATE PRN MCG: 50 INJECTION INTRAMUSCULAR; INTRAVENOUS at 16:25

## 2017-07-18 RX ADMIN — FENTANYL CITRATE PRN MCG: 50 INJECTION INTRAMUSCULAR; INTRAVENOUS at 16:58

## 2017-07-18 RX ADMIN — FENTANYL CITRATE PRN MCG: 50 INJECTION INTRAMUSCULAR; INTRAVENOUS at 16:19

## 2018-07-17 NOTE — PROGRESS NOTES
Pt c/o h/a this am medicated with tylenoliv infusing wyatt draining yellow urine.  rlq pain pain ok at present.   padded s/r up two/stretcher

## 2023-08-10 ENCOUNTER — HOSPITAL ENCOUNTER (INPATIENT)
Facility: MEDICAL CENTER | Age: 63
LOS: 2 days | DRG: 378 | End: 2023-08-12
Attending: STUDENT IN AN ORGANIZED HEALTH CARE EDUCATION/TRAINING PROGRAM | Admitting: STUDENT IN AN ORGANIZED HEALTH CARE EDUCATION/TRAINING PROGRAM
Payer: MEDICARE

## 2023-08-10 DIAGNOSIS — D62 ACUTE BLOOD LOSS ANEMIA: ICD-10-CM

## 2023-08-10 DIAGNOSIS — G44.211 INTRACTABLE EPISODIC TENSION-TYPE HEADACHE: ICD-10-CM

## 2023-08-10 DIAGNOSIS — K92.1 GASTROINTESTINAL HEMORRHAGE WITH MELENA: ICD-10-CM

## 2023-08-10 PROBLEM — K92.2 GI BLEED: Status: ACTIVE | Noted: 2023-08-10

## 2023-08-10 LAB
ALBUMIN SERPL BCP-MCNC: 3.9 G/DL (ref 3.2–4.9)
ALBUMIN/GLOB SERPL: 1.4 G/DL
ALP SERPL-CCNC: 100 U/L (ref 30–99)
ALT SERPL-CCNC: 9 U/L (ref 2–50)
ANION GAP SERPL CALC-SCNC: 11 MMOL/L (ref 7–16)
AST SERPL-CCNC: 17 U/L (ref 12–45)
BASOPHILS # BLD AUTO: 0.2 % (ref 0–1.8)
BASOPHILS # BLD: 0.02 K/UL (ref 0–0.12)
BILIRUB SERPL-MCNC: 0.3 MG/DL (ref 0.1–1.5)
BUN SERPL-MCNC: 16 MG/DL (ref 8–22)
CALCIUM ALBUM COR SERPL-MCNC: 9 MG/DL (ref 8.5–10.5)
CALCIUM SERPL-MCNC: 8.9 MG/DL (ref 8.5–10.5)
CHLORIDE SERPL-SCNC: 109 MMOL/L (ref 96–112)
CO2 SERPL-SCNC: 21 MMOL/L (ref 20–33)
CREAT SERPL-MCNC: 0.68 MG/DL (ref 0.5–1.4)
CRP SERPL HS-MCNC: <0.3 MG/DL (ref 0–0.75)
EOSINOPHIL # BLD AUTO: 0.02 K/UL (ref 0–0.51)
EOSINOPHIL NFR BLD: 0.2 % (ref 0–6.9)
ERYTHROCYTE [DISTWIDTH] IN BLOOD BY AUTOMATED COUNT: 54.3 FL (ref 35.9–50)
FERRITIN SERPL-MCNC: 9.5 NG/ML (ref 10–291)
FIBRINOGEN PPP-MCNC: 276 MG/DL (ref 215–460)
FOLATE SERPL-MCNC: 18 NG/ML
GFR SERPLBLD CREATININE-BSD FMLA CKD-EPI: 97 ML/MIN/1.73 M 2
GLOBULIN SER CALC-MCNC: 2.7 G/DL (ref 1.9–3.5)
GLUCOSE SERPL-MCNC: 106 MG/DL (ref 65–99)
HCT VFR BLD AUTO: 24.8 % (ref 37–47)
HGB BLD-MCNC: 7.8 G/DL (ref 12–16)
HGB RETIC QN AUTO: 20.6 PG/CELL (ref 29–35)
IMM GRANULOCYTES # BLD AUTO: 0.04 K/UL (ref 0–0.11)
IMM GRANULOCYTES NFR BLD AUTO: 0.4 % (ref 0–0.9)
IMM RETICS NFR: 31.7 % (ref 2.6–16.1)
INR PPP: 1.15 (ref 0.87–1.13)
IRON SATN MFR SERPL: 11 % (ref 15–55)
IRON SERPL-MCNC: 38 UG/DL (ref 40–170)
LDH SERPL L TO P-CCNC: 172 U/L (ref 107–266)
LYMPHOCYTES # BLD AUTO: 1.88 K/UL (ref 1–4.8)
LYMPHOCYTES NFR BLD: 17.5 % (ref 22–41)
MAGNESIUM SERPL-MCNC: 2.2 MG/DL (ref 1.5–2.5)
MCH RBC QN AUTO: 26.9 PG (ref 27–33)
MCHC RBC AUTO-ENTMCNC: 31.5 G/DL (ref 32.2–35.5)
MCV RBC AUTO: 85.5 FL (ref 81.4–97.8)
MONOCYTES # BLD AUTO: 0.45 K/UL (ref 0–0.85)
MONOCYTES NFR BLD AUTO: 4.2 % (ref 0–13.4)
NEUTROPHILS # BLD AUTO: 8.33 K/UL (ref 1.82–7.42)
NEUTROPHILS NFR BLD: 77.5 % (ref 44–72)
NRBC # BLD AUTO: 0 K/UL
NRBC BLD-RTO: 0 /100 WBC (ref 0–0.2)
PHOSPHATE SERPL-MCNC: 3.4 MG/DL (ref 2.5–4.5)
PLATELET # BLD AUTO: 362 K/UL (ref 164–446)
PMV BLD AUTO: 8.2 FL (ref 9–12.9)
POTASSIUM SERPL-SCNC: 3.6 MMOL/L (ref 3.6–5.5)
PROT SERPL-MCNC: 6.6 G/DL (ref 6–8.2)
PROTHROMBIN TIME: 14.5 SEC (ref 12–14.6)
RBC # BLD AUTO: 2.9 M/UL (ref 4.2–5.4)
RETICS # AUTO: 0.08 M/UL (ref 0.04–0.12)
RETICS/RBC NFR: 2.7 % (ref 0.8–2.6)
SODIUM SERPL-SCNC: 141 MMOL/L (ref 135–145)
TIBC SERPL-MCNC: 355 UG/DL (ref 250–450)
TRANSFERRIN SERPL-MCNC: 293 MG/DL (ref 200–370)
TSH SERPL DL<=0.005 MIU/L-ACNC: 1.24 UIU/ML (ref 0.38–5.33)
UIBC SERPL-MCNC: 317 UG/DL (ref 110–370)
VIT B12 SERPL-MCNC: 887 PG/ML (ref 211–911)
WBC # BLD AUTO: 10.7 K/UL (ref 4.8–10.8)

## 2023-08-10 PROCEDURE — G0316 PR PROLONGED IP/OBS E&M EA 15 MIN: HCPCS | Performed by: STUDENT IN AN ORGANIZED HEALTH CARE EDUCATION/TRAINING PROGRAM

## 2023-08-10 PROCEDURE — 770001 HCHG ROOM/CARE - MED/SURG/GYN PRIV*

## 2023-08-10 PROCEDURE — 85610 PROTHROMBIN TIME: CPT

## 2023-08-10 PROCEDURE — 36415 COLL VENOUS BLD VENIPUNCTURE: CPT

## 2023-08-10 PROCEDURE — 85384 FIBRINOGEN ACTIVITY: CPT

## 2023-08-10 PROCEDURE — 85025 COMPLETE CBC W/AUTO DIFF WBC: CPT

## 2023-08-10 PROCEDURE — 83735 ASSAY OF MAGNESIUM: CPT

## 2023-08-10 PROCEDURE — 99223 1ST HOSP IP/OBS HIGH 75: CPT | Mod: AI | Performed by: STUDENT IN AN ORGANIZED HEALTH CARE EDUCATION/TRAINING PROGRAM

## 2023-08-10 PROCEDURE — 82607 VITAMIN B-12: CPT

## 2023-08-10 PROCEDURE — 83615 LACTATE (LD) (LDH) ENZYME: CPT

## 2023-08-10 PROCEDURE — 83550 IRON BINDING TEST: CPT

## 2023-08-10 PROCEDURE — 86140 C-REACTIVE PROTEIN: CPT

## 2023-08-10 PROCEDURE — 85046 RETICYTE/HGB CONCENTRATE: CPT

## 2023-08-10 PROCEDURE — 82728 ASSAY OF FERRITIN: CPT

## 2023-08-10 PROCEDURE — 84466 ASSAY OF TRANSFERRIN: CPT

## 2023-08-10 PROCEDURE — 83010 ASSAY OF HAPTOGLOBIN QUANT: CPT

## 2023-08-10 PROCEDURE — 84443 ASSAY THYROID STIM HORMONE: CPT

## 2023-08-10 PROCEDURE — 82746 ASSAY OF FOLIC ACID SERUM: CPT

## 2023-08-10 PROCEDURE — 85652 RBC SED RATE AUTOMATED: CPT

## 2023-08-10 PROCEDURE — 84100 ASSAY OF PHOSPHORUS: CPT

## 2023-08-10 PROCEDURE — 80053 COMPREHEN METABOLIC PANEL: CPT

## 2023-08-10 PROCEDURE — 83540 ASSAY OF IRON: CPT

## 2023-08-10 RX ORDER — BISACODYL 10 MG
10 SUPPOSITORY, RECTAL RECTAL
Status: DISCONTINUED | OUTPATIENT
Start: 2023-08-10 | End: 2023-08-12 | Stop reason: HOSPADM

## 2023-08-10 RX ORDER — CHOLECALCIFEROL (VITAMIN D3) 125 MCG
1000 CAPSULE ORAL DAILY
Status: DISCONTINUED | OUTPATIENT
Start: 2023-08-11 | End: 2023-08-12 | Stop reason: HOSPADM

## 2023-08-10 RX ORDER — GUAIFENESIN/DEXTROMETHORPHAN 100-10MG/5
10 SYRUP ORAL EVERY 6 HOURS PRN
Status: DISCONTINUED | OUTPATIENT
Start: 2023-08-10 | End: 2023-08-12 | Stop reason: HOSPADM

## 2023-08-10 RX ORDER — POLYETHYLENE GLYCOL 3350 17 G/17G
1 POWDER, FOR SOLUTION ORAL
Status: DISCONTINUED | OUTPATIENT
Start: 2023-08-10 | End: 2023-08-12 | Stop reason: HOSPADM

## 2023-08-10 RX ORDER — PANTOPRAZOLE SODIUM 40 MG/10ML
40 INJECTION, POWDER, LYOPHILIZED, FOR SOLUTION INTRAVENOUS 2 TIMES DAILY
Status: DISCONTINUED | OUTPATIENT
Start: 2023-08-11 | End: 2023-08-11

## 2023-08-10 RX ORDER — ACETAMINOPHEN 325 MG/1
650 TABLET ORAL EVERY 6 HOURS PRN
Status: DISCONTINUED | OUTPATIENT
Start: 2023-08-10 | End: 2023-08-12 | Stop reason: HOSPADM

## 2023-08-10 RX ORDER — FOLIC ACID 1 MG/1
1 TABLET ORAL DAILY
Status: DISCONTINUED | OUTPATIENT
Start: 2023-08-11 | End: 2023-08-12 | Stop reason: HOSPADM

## 2023-08-10 RX ORDER — LABETALOL HYDROCHLORIDE 5 MG/ML
10 INJECTION, SOLUTION INTRAVENOUS EVERY 4 HOURS PRN
Status: DISCONTINUED | OUTPATIENT
Start: 2023-08-10 | End: 2023-08-12 | Stop reason: HOSPADM

## 2023-08-10 RX ORDER — ONDANSETRON 4 MG/1
4 TABLET, ORALLY DISINTEGRATING ORAL EVERY 4 HOURS PRN
Status: DISCONTINUED | OUTPATIENT
Start: 2023-08-10 | End: 2023-08-12 | Stop reason: HOSPADM

## 2023-08-10 RX ORDER — ONDANSETRON 2 MG/ML
4 INJECTION INTRAMUSCULAR; INTRAVENOUS EVERY 4 HOURS PRN
Status: DISCONTINUED | OUTPATIENT
Start: 2023-08-10 | End: 2023-08-12 | Stop reason: HOSPADM

## 2023-08-10 RX ORDER — AMOXICILLIN 250 MG
2 CAPSULE ORAL 2 TIMES DAILY
Status: DISCONTINUED | OUTPATIENT
Start: 2023-08-10 | End: 2023-08-12 | Stop reason: HOSPADM

## 2023-08-10 ASSESSMENT — COGNITIVE AND FUNCTIONAL STATUS - GENERAL
MOBILITY SCORE: 19
SUGGESTED CMS G CODE MODIFIER DAILY ACTIVITY: CJ
MOVING TO AND FROM BED TO CHAIR: A LITTLE
DRESSING REGULAR UPPER BODY CLOTHING: A LITTLE
MOVING FROM LYING ON BACK TO SITTING ON SIDE OF FLAT BED: A LITTLE
HELP NEEDED FOR BATHING: A LITTLE
SUGGESTED CMS G CODE MODIFIER MOBILITY: CK
WALKING IN HOSPITAL ROOM: A LITTLE
STANDING UP FROM CHAIR USING ARMS: A LITTLE
DAILY ACTIVITIY SCORE: 21
CLIMB 3 TO 5 STEPS WITH RAILING: A LITTLE
DRESSING REGULAR LOWER BODY CLOTHING: A LITTLE

## 2023-08-10 ASSESSMENT — LIFESTYLE VARIABLES
TOTAL SCORE: 0
HAVE PEOPLE ANNOYED YOU BY CRITICIZING YOUR DRINKING: NO
EVER HAD A DRINK FIRST THING IN THE MORNING TO STEADY YOUR NERVES TO GET RID OF A HANGOVER: NO
HOW MANY TIMES IN THE PAST YEAR HAVE YOU HAD 5 OR MORE DRINKS IN A DAY: 0
ALCOHOL_USE: NO
EVER FELT BAD OR GUILTY ABOUT YOUR DRINKING: NO
TOTAL SCORE: 0
HAVE YOU EVER FELT YOU SHOULD CUT DOWN ON YOUR DRINKING: NO
ON A TYPICAL DAY WHEN YOU DRINK ALCOHOL HOW MANY DRINKS DO YOU HAVE: 0
AVERAGE NUMBER OF DAYS PER WEEK YOU HAVE A DRINK CONTAINING ALCOHOL: 0
CONSUMPTION TOTAL: NEGATIVE
TOTAL SCORE: 0

## 2023-08-10 ASSESSMENT — PATIENT HEALTH QUESTIONNAIRE - PHQ9
1. LITTLE INTEREST OR PLEASURE IN DOING THINGS: NOT AT ALL
SUM OF ALL RESPONSES TO PHQ9 QUESTIONS 1 AND 2: 0
2. FEELING DOWN, DEPRESSED, IRRITABLE, OR HOPELESS: NOT AT ALL

## 2023-08-10 ASSESSMENT — PAIN DESCRIPTION - PAIN TYPE: TYPE: ACUTE PAIN

## 2023-08-11 ENCOUNTER — HOSPITAL ENCOUNTER (OUTPATIENT)
Dept: RADIOLOGY | Facility: MEDICAL CENTER | Age: 63
End: 2023-08-11
Attending: EMERGENCY MEDICINE
Payer: MEDICARE

## 2023-08-11 ENCOUNTER — ANESTHESIA (OUTPATIENT)
Dept: SURGERY | Facility: MEDICAL CENTER | Age: 63
DRG: 378 | End: 2023-08-11
Payer: MEDICARE

## 2023-08-11 ENCOUNTER — ANESTHESIA EVENT (OUTPATIENT)
Dept: SURGERY | Facility: MEDICAL CENTER | Age: 63
DRG: 378 | End: 2023-08-11
Payer: MEDICARE

## 2023-08-11 ENCOUNTER — APPOINTMENT (OUTPATIENT)
Dept: RADIOLOGY | Facility: MEDICAL CENTER | Age: 63
DRG: 378 | End: 2023-08-11
Attending: INTERNAL MEDICINE
Payer: MEDICARE

## 2023-08-11 PROBLEM — R51.9 HEADACHE: Status: ACTIVE | Noted: 2023-08-11

## 2023-08-11 PROBLEM — D62 ACUTE BLOOD LOSS ANEMIA: Status: ACTIVE | Noted: 2023-08-11

## 2023-08-11 PROBLEM — R55 SYNCOPE: Status: ACTIVE | Noted: 2023-08-11

## 2023-08-11 LAB
ABO + RH BLD: NORMAL
ABO GROUP BLD: NORMAL
ALBUMIN SERPL BCP-MCNC: 3.6 G/DL (ref 3.2–4.9)
ALBUMIN/GLOB SERPL: 1.4 G/DL
ALP SERPL-CCNC: 96 U/L (ref 30–99)
ALT SERPL-CCNC: 6 U/L (ref 2–50)
ANION GAP SERPL CALC-SCNC: 10 MMOL/L (ref 7–16)
APPEARANCE UR: CLEAR
AST SERPL-CCNC: 14 U/L (ref 12–45)
BACTERIA #/AREA URNS HPF: NEGATIVE /HPF
BARCODED ABORH UBTYP: 600
BARCODED PRD CODE UBPRD: NORMAL
BARCODED UNIT NUM UBUNT: NORMAL
BASOPHILS # BLD AUTO: 0.4 % (ref 0–1.8)
BASOPHILS # BLD: 0.03 K/UL (ref 0–0.12)
BILIRUB SERPL-MCNC: 0.3 MG/DL (ref 0.1–1.5)
BILIRUB UR QL STRIP.AUTO: NEGATIVE
BLD GP AB SCN SERPL QL: NORMAL
BUN SERPL-MCNC: 14 MG/DL (ref 8–22)
CALCIUM ALBUM COR SERPL-MCNC: 8.8 MG/DL (ref 8.5–10.5)
CALCIUM SERPL-MCNC: 8.5 MG/DL (ref 8.5–10.5)
CFT BLD TEG: 4.6 MIN (ref 4.6–9.1)
CFT P HPASE BLD TEG: 4.8 MIN (ref 4.3–8.3)
CHLORIDE SERPL-SCNC: 111 MMOL/L (ref 96–112)
CLOT ANGLE BLD TEG: 80.9 DEGREES (ref 63–78)
CLOT LYSIS 30M P MA LENFR BLD TEG: 0.3 % (ref 0–2.6)
CO2 SERPL-SCNC: 22 MMOL/L (ref 20–33)
COLOR UR: YELLOW
COMPONENT R 8504R: NORMAL
CREAT SERPL-MCNC: 0.68 MG/DL (ref 0.5–1.4)
CT.EXTRINSIC BLD ROTEM: 0.7 MIN (ref 0.8–2.1)
EOSINOPHIL # BLD AUTO: 0.07 K/UL (ref 0–0.51)
EOSINOPHIL NFR BLD: 0.8 % (ref 0–6.9)
EPI CELLS #/AREA URNS HPF: NEGATIVE /HPF
ERYTHROCYTE [DISTWIDTH] IN BLOOD BY AUTOMATED COUNT: 53.1 FL (ref 35.9–50)
ERYTHROCYTE [SEDIMENTATION RATE] IN BLOOD BY WESTERGREN METHOD: 35 MM/HOUR (ref 0–25)
GFR SERPLBLD CREATININE-BSD FMLA CKD-EPI: 97 ML/MIN/1.73 M 2
GLOBULIN SER CALC-MCNC: 2.5 G/DL (ref 1.9–3.5)
GLUCOSE SERPL-MCNC: 98 MG/DL (ref 65–99)
GLUCOSE UR STRIP.AUTO-MCNC: NEGATIVE MG/DL
HCT VFR BLD AUTO: 23.6 % (ref 37–47)
HCT VFR BLD AUTO: 29 % (ref 37–47)
HGB BLD-MCNC: 7.2 G/DL (ref 12–16)
HGB BLD-MCNC: 9.1 G/DL (ref 12–16)
HYALINE CASTS #/AREA URNS LPF: ABNORMAL /LPF
IMM GRANULOCYTES # BLD AUTO: 0.03 K/UL (ref 0–0.11)
IMM GRANULOCYTES NFR BLD AUTO: 0.4 % (ref 0–0.9)
KETONES UR STRIP.AUTO-MCNC: NEGATIVE MG/DL
LEUKOCYTE ESTERASE UR QL STRIP.AUTO: ABNORMAL
LYMPHOCYTES # BLD AUTO: 2.02 K/UL (ref 1–4.8)
LYMPHOCYTES NFR BLD: 23.9 % (ref 22–41)
MAGNESIUM SERPL-MCNC: 2.1 MG/DL (ref 1.5–2.5)
MCF BLD TEG: 68.5 MM (ref 52–69)
MCF.PLATELET INHIB BLD ROTEM: 32.2 MM (ref 15–32)
MCH RBC QN AUTO: 26 PG (ref 27–33)
MCHC RBC AUTO-ENTMCNC: 30.5 G/DL (ref 32.2–35.5)
MCV RBC AUTO: 85.2 FL (ref 81.4–97.8)
MICRO URNS: ABNORMAL
MONOCYTES # BLD AUTO: 0.54 K/UL (ref 0–0.85)
MONOCYTES NFR BLD AUTO: 6.4 % (ref 0–13.4)
NEUTROPHILS # BLD AUTO: 5.76 K/UL (ref 1.82–7.42)
NEUTROPHILS NFR BLD: 68.1 % (ref 44–72)
NITRITE UR QL STRIP.AUTO: NEGATIVE
NRBC # BLD AUTO: 0 K/UL
NRBC BLD-RTO: 0 /100 WBC (ref 0–0.2)
PATHOLOGY CONSULT NOTE: NORMAL
PH UR STRIP.AUTO: 7.5 [PH] (ref 5–8)
PHOSPHATE SERPL-MCNC: 3.4 MG/DL (ref 2.5–4.5)
PLATELET # BLD AUTO: 428 K/UL (ref 164–446)
PMV BLD AUTO: 8.2 FL (ref 9–12.9)
POTASSIUM SERPL-SCNC: 3.7 MMOL/L (ref 3.6–5.5)
PRODUCT TYPE UPROD: NORMAL
PROT SERPL-MCNC: 6.1 G/DL (ref 6–8.2)
PROT UR QL STRIP: NEGATIVE MG/DL
RBC # BLD AUTO: 2.77 M/UL (ref 4.2–5.4)
RBC # URNS HPF: ABNORMAL /HPF
RBC UR QL AUTO: NEGATIVE
RH BLD: NORMAL
SODIUM SERPL-SCNC: 143 MMOL/L (ref 135–145)
SP GR UR STRIP.AUTO: 1.01
TEG ALGORITHM TGALG: ABNORMAL
UNIT STATUS USTAT: NORMAL
UROBILINOGEN UR STRIP.AUTO-MCNC: 0.2 MG/DL
WBC # BLD AUTO: 8.5 K/UL (ref 4.8–10.8)
WBC #/AREA URNS HPF: ABNORMAL /HPF

## 2023-08-11 PROCEDURE — 700111 HCHG RX REV CODE 636 W/ 250 OVERRIDE (IP): Mod: JZ | Performed by: STUDENT IN AN ORGANIZED HEALTH CARE EDUCATION/TRAINING PROGRAM

## 2023-08-11 PROCEDURE — 0DB68ZX EXCISION OF STOMACH, VIA NATURAL OR ARTIFICIAL OPENING ENDOSCOPIC, DIAGNOSTIC: ICD-10-PCS | Performed by: INTERNAL MEDICINE

## 2023-08-11 PROCEDURE — 700101 HCHG RX REV CODE 250: Performed by: STUDENT IN AN ORGANIZED HEALTH CARE EDUCATION/TRAINING PROGRAM

## 2023-08-11 PROCEDURE — A9270 NON-COVERED ITEM OR SERVICE: HCPCS

## 2023-08-11 PROCEDURE — 700102 HCHG RX REV CODE 250 W/ 637 OVERRIDE(OP): Performed by: STUDENT IN AN ORGANIZED HEALTH CARE EDUCATION/TRAINING PROGRAM

## 2023-08-11 PROCEDURE — 99232 SBSQ HOSP IP/OBS MODERATE 35: CPT | Performed by: INTERNAL MEDICINE

## 2023-08-11 PROCEDURE — 80053 COMPREHEN METABOLIC PANEL: CPT

## 2023-08-11 PROCEDURE — A9270 NON-COVERED ITEM OR SERVICE: HCPCS | Performed by: INTERNAL MEDICINE

## 2023-08-11 PROCEDURE — 160002 HCHG RECOVERY MINUTES (STAT): Performed by: INTERNAL MEDICINE

## 2023-08-11 PROCEDURE — 700111 HCHG RX REV CODE 636 W/ 250 OVERRIDE (IP): Performed by: ANESTHESIOLOGY

## 2023-08-11 PROCEDURE — 86900 BLOOD TYPING SEROLOGIC ABO: CPT

## 2023-08-11 PROCEDURE — 85014 HEMATOCRIT: CPT

## 2023-08-11 PROCEDURE — 700105 HCHG RX REV CODE 258: Performed by: STUDENT IN AN ORGANIZED HEALTH CARE EDUCATION/TRAINING PROGRAM

## 2023-08-11 PROCEDURE — 86901 BLOOD TYPING SEROLOGIC RH(D): CPT

## 2023-08-11 PROCEDURE — 160009 HCHG ANES TIME/MIN: Performed by: INTERNAL MEDICINE

## 2023-08-11 PROCEDURE — P9016 RBC LEUKOCYTES REDUCED: HCPCS

## 2023-08-11 PROCEDURE — 700101 HCHG RX REV CODE 250: Performed by: INTERNAL MEDICINE

## 2023-08-11 PROCEDURE — 88313 SPECIAL STAINS GROUP 2: CPT | Mod: 91

## 2023-08-11 PROCEDURE — A9270 NON-COVERED ITEM OR SERVICE: HCPCS | Performed by: STUDENT IN AN ORGANIZED HEALTH CARE EDUCATION/TRAINING PROGRAM

## 2023-08-11 PROCEDURE — 700111 HCHG RX REV CODE 636 W/ 250 OVERRIDE (IP): Performed by: STUDENT IN AN ORGANIZED HEALTH CARE EDUCATION/TRAINING PROGRAM

## 2023-08-11 PROCEDURE — 160035 HCHG PACU - 1ST 60 MINS PHASE I: Performed by: INTERNAL MEDICINE

## 2023-08-11 PROCEDURE — 84100 ASSAY OF PHOSPHORUS: CPT

## 2023-08-11 PROCEDURE — 160048 HCHG OR STATISTICAL LEVEL 1-5: Performed by: INTERNAL MEDICINE

## 2023-08-11 PROCEDURE — 700105 HCHG RX REV CODE 258: Mod: JZ | Performed by: ANESTHESIOLOGY

## 2023-08-11 PROCEDURE — 36430 TRANSFUSION BLD/BLD COMPNT: CPT

## 2023-08-11 PROCEDURE — 88312 SPECIAL STAINS GROUP 1: CPT

## 2023-08-11 PROCEDURE — 85018 HEMOGLOBIN: CPT

## 2023-08-11 PROCEDURE — 700102 HCHG RX REV CODE 250 W/ 637 OVERRIDE(OP)

## 2023-08-11 PROCEDURE — C9113 INJ PANTOPRAZOLE SODIUM, VIA: HCPCS | Performed by: STUDENT IN AN ORGANIZED HEALTH CARE EDUCATION/TRAINING PROGRAM

## 2023-08-11 PROCEDURE — 86923 COMPATIBILITY TEST ELECTRIC: CPT

## 2023-08-11 PROCEDURE — 85025 COMPLETE CBC W/AUTO DIFF WBC: CPT

## 2023-08-11 PROCEDURE — 88342 IMHCHEM/IMCYTCHM 1ST ANTB: CPT

## 2023-08-11 PROCEDURE — 30233N1 TRANSFUSION OF NONAUTOLOGOUS RED BLOOD CELLS INTO PERIPHERAL VEIN, PERCUTANEOUS APPROACH: ICD-10-PCS | Performed by: STUDENT IN AN ORGANIZED HEALTH CARE EDUCATION/TRAINING PROGRAM

## 2023-08-11 PROCEDURE — 700102 HCHG RX REV CODE 250 W/ 637 OVERRIDE(OP): Performed by: INTERNAL MEDICINE

## 2023-08-11 PROCEDURE — 85384 FIBRINOGEN ACTIVITY: CPT

## 2023-08-11 PROCEDURE — 85576 BLOOD PLATELET AGGREGATION: CPT

## 2023-08-11 PROCEDURE — 81001 URINALYSIS AUTO W/SCOPE: CPT

## 2023-08-11 PROCEDURE — 160203 HCHG ENDO MINUTES - 1ST 30 MINS LEVEL 4: Performed by: INTERNAL MEDICINE

## 2023-08-11 PROCEDURE — 83735 ASSAY OF MAGNESIUM: CPT

## 2023-08-11 PROCEDURE — 36415 COLL VENOUS BLD VENIPUNCTURE: CPT

## 2023-08-11 PROCEDURE — 72040 X-RAY EXAM NECK SPINE 2-3 VW: CPT

## 2023-08-11 PROCEDURE — 86850 RBC ANTIBODY SCREEN: CPT

## 2023-08-11 PROCEDURE — 700101 HCHG RX REV CODE 250: Performed by: ANESTHESIOLOGY

## 2023-08-11 PROCEDURE — 99223 1ST HOSP IP/OBS HIGH 75: CPT | Performed by: NURSE PRACTITIONER

## 2023-08-11 PROCEDURE — 88305 TISSUE EXAM BY PATHOLOGIST: CPT | Mod: 59

## 2023-08-11 PROCEDURE — 0DB98ZX EXCISION OF DUODENUM, VIA NATURAL OR ARTIFICIAL OPENING ENDOSCOPIC, DIAGNOSTIC: ICD-10-PCS | Performed by: INTERNAL MEDICINE

## 2023-08-11 PROCEDURE — 770001 HCHG ROOM/CARE - MED/SURG/GYN PRIV*

## 2023-08-11 PROCEDURE — 85347 COAGULATION TIME ACTIVATED: CPT

## 2023-08-11 RX ORDER — SODIUM CHLORIDE, SODIUM LACTATE, POTASSIUM CHLORIDE, CALCIUM CHLORIDE 600; 310; 30; 20 MG/100ML; MG/100ML; MG/100ML; MG/100ML
INJECTION, SOLUTION INTRAVENOUS CONTINUOUS
Status: DISCONTINUED | OUTPATIENT
Start: 2023-08-11 | End: 2023-08-11 | Stop reason: HOSPADM

## 2023-08-11 RX ORDER — HYDROMORPHONE HYDROCHLORIDE 1 MG/ML
0.2 INJECTION, SOLUTION INTRAMUSCULAR; INTRAVENOUS; SUBCUTANEOUS
Status: DISCONTINUED | OUTPATIENT
Start: 2023-08-11 | End: 2023-08-11 | Stop reason: HOSPADM

## 2023-08-11 RX ORDER — HALOPERIDOL 5 MG/ML
1 INJECTION INTRAMUSCULAR
Status: DISCONTINUED | OUTPATIENT
Start: 2023-08-11 | End: 2023-08-11 | Stop reason: HOSPADM

## 2023-08-11 RX ORDER — OMEPRAZOLE 20 MG/1
20 CAPSULE, DELAYED RELEASE ORAL DAILY
Status: DISCONTINUED | OUTPATIENT
Start: 2023-08-11 | End: 2023-08-12 | Stop reason: HOSPADM

## 2023-08-11 RX ORDER — LIDOCAINE 50 MG/G
1 PATCH TOPICAL EVERY 24 HOURS
Status: DISCONTINUED | OUTPATIENT
Start: 2023-08-11 | End: 2023-08-12 | Stop reason: HOSPADM

## 2023-08-11 RX ORDER — SODIUM CHLORIDE 9 MG/ML
INJECTION, SOLUTION INTRAVENOUS CONTINUOUS
Status: ACTIVE | OUTPATIENT
Start: 2023-08-11 | End: 2023-08-11

## 2023-08-11 RX ORDER — LIDOCAINE HYDROCHLORIDE 20 MG/ML
INJECTION, SOLUTION EPIDURAL; INFILTRATION; INTRACAUDAL; PERINEURAL PRN
Status: DISCONTINUED | OUTPATIENT
Start: 2023-08-11 | End: 2023-08-11 | Stop reason: SURG

## 2023-08-11 RX ORDER — HYDRALAZINE HYDROCHLORIDE 20 MG/ML
5 INJECTION INTRAMUSCULAR; INTRAVENOUS
Status: DISCONTINUED | OUTPATIENT
Start: 2023-08-11 | End: 2023-08-11 | Stop reason: HOSPADM

## 2023-08-11 RX ORDER — EPHEDRINE SULFATE 50 MG/ML
5 INJECTION, SOLUTION INTRAVENOUS
Status: DISCONTINUED | OUTPATIENT
Start: 2023-08-11 | End: 2023-08-11 | Stop reason: HOSPADM

## 2023-08-11 RX ORDER — BUTALBITAL, ACETAMINOPHEN AND CAFFEINE 50; 325; 40 MG/1; MG/1; MG/1
2 TABLET ORAL EVERY 6 HOURS PRN
Status: DISCONTINUED | OUTPATIENT
Start: 2023-08-11 | End: 2023-08-12 | Stop reason: HOSPADM

## 2023-08-11 RX ORDER — OXYCODONE HCL 5 MG/5 ML
5 SOLUTION, ORAL ORAL
Status: DISCONTINUED | OUTPATIENT
Start: 2023-08-11 | End: 2023-08-11 | Stop reason: HOSPADM

## 2023-08-11 RX ORDER — ONDANSETRON 2 MG/ML
4 INJECTION INTRAMUSCULAR; INTRAVENOUS
Status: DISCONTINUED | OUTPATIENT
Start: 2023-08-11 | End: 2023-08-11 | Stop reason: HOSPADM

## 2023-08-11 RX ORDER — HYDROMORPHONE HYDROCHLORIDE 1 MG/ML
0.1 INJECTION, SOLUTION INTRAMUSCULAR; INTRAVENOUS; SUBCUTANEOUS
Status: DISCONTINUED | OUTPATIENT
Start: 2023-08-11 | End: 2023-08-11 | Stop reason: HOSPADM

## 2023-08-11 RX ORDER — LABETALOL HYDROCHLORIDE 5 MG/ML
INJECTION, SOLUTION INTRAVENOUS PRN
Status: DISCONTINUED | OUTPATIENT
Start: 2023-08-11 | End: 2023-08-11 | Stop reason: SURG

## 2023-08-11 RX ORDER — CALCIUM CARBONATE 500 MG/1
500 TABLET, CHEWABLE ORAL 4 TIMES DAILY PRN
Status: DISCONTINUED | OUTPATIENT
Start: 2023-08-11 | End: 2023-08-12 | Stop reason: HOSPADM

## 2023-08-11 RX ORDER — SODIUM CHLORIDE, SODIUM LACTATE, POTASSIUM CHLORIDE, CALCIUM CHLORIDE 600; 310; 30; 20 MG/100ML; MG/100ML; MG/100ML; MG/100ML
INJECTION, SOLUTION INTRAVENOUS
Status: DISCONTINUED | OUTPATIENT
Start: 2023-08-11 | End: 2023-08-11 | Stop reason: SURG

## 2023-08-11 RX ORDER — HYDROMORPHONE HYDROCHLORIDE 1 MG/ML
0.4 INJECTION, SOLUTION INTRAMUSCULAR; INTRAVENOUS; SUBCUTANEOUS
Status: DISCONTINUED | OUTPATIENT
Start: 2023-08-11 | End: 2023-08-11 | Stop reason: HOSPADM

## 2023-08-11 RX ORDER — DIPHENHYDRAMINE HYDROCHLORIDE 50 MG/ML
12.5 INJECTION INTRAMUSCULAR; INTRAVENOUS
Status: DISCONTINUED | OUTPATIENT
Start: 2023-08-11 | End: 2023-08-11 | Stop reason: HOSPADM

## 2023-08-11 RX ORDER — SUCRALFATE 1 G/1
1 TABLET ORAL EVERY 6 HOURS
Status: DISCONTINUED | OUTPATIENT
Start: 2023-08-11 | End: 2023-08-12 | Stop reason: HOSPADM

## 2023-08-11 RX ORDER — FERROUS SULFATE 325(65) MG
325 TABLET ORAL
Status: DISCONTINUED | OUTPATIENT
Start: 2023-08-11 | End: 2023-08-12 | Stop reason: HOSPADM

## 2023-08-11 RX ORDER — OXYCODONE HCL 5 MG/5 ML
10 SOLUTION, ORAL ORAL
Status: DISCONTINUED | OUTPATIENT
Start: 2023-08-11 | End: 2023-08-11 | Stop reason: HOSPADM

## 2023-08-11 RX ADMIN — PROPOFOL 50 MG: 10 INJECTION, EMULSION INTRAVENOUS at 11:12

## 2023-08-11 RX ADMIN — LIDOCAINE 1 PATCH: 700 PATCH TOPICAL at 16:05

## 2023-08-11 RX ADMIN — PANTOPRAZOLE SODIUM 40 MG: 40 INJECTION, POWDER, FOR SOLUTION INTRAVENOUS at 06:43

## 2023-08-11 RX ADMIN — SENNOSIDES AND DOCUSATE SODIUM 2 TABLET: 50; 8.6 TABLET ORAL at 17:09

## 2023-08-11 RX ADMIN — LABETALOL HYDROCHLORIDE 5 MG: 5 INJECTION, SOLUTION INTRAVENOUS at 11:18

## 2023-08-11 RX ADMIN — PROPOFOL 50 MG: 10 INJECTION, EMULSION INTRAVENOUS at 11:10

## 2023-08-11 RX ADMIN — ACETAMINOPHEN 650 MG: 325 TABLET, FILM COATED ORAL at 20:37

## 2023-08-11 RX ADMIN — PROPOFOL 50 MG: 10 INJECTION, EMULSION INTRAVENOUS at 11:14

## 2023-08-11 RX ADMIN — SUCRALFATE 1 G: 1 TABLET ORAL at 17:09

## 2023-08-11 RX ADMIN — LABETALOL HYDROCHLORIDE 5 MG: 5 INJECTION, SOLUTION INTRAVENOUS at 11:21

## 2023-08-11 RX ADMIN — LIDOCAINE HYDROCHLORIDE 50 MG: 20 INJECTION, SOLUTION EPIDURAL; INFILTRATION; INTRACAUDAL at 11:10

## 2023-08-11 RX ADMIN — BUTALBITAL, ACETAMINOPHEN AND CAFFEINE 2 TABLET: 325; 50; 40 TABLET ORAL at 16:29

## 2023-08-11 RX ADMIN — FOLIC ACID 1 MG: 1 TABLET ORAL at 06:43

## 2023-08-11 RX ADMIN — SUCRALFATE 1 G: 1 TABLET ORAL at 13:00

## 2023-08-11 RX ADMIN — SUCRALFATE 1 G: 1 TABLET ORAL at 23:46

## 2023-08-11 RX ADMIN — ANTACID TABLETS 500 MG: 500 TABLET, CHEWABLE ORAL at 22:32

## 2023-08-11 RX ADMIN — ACETAMINOPHEN 650 MG: 325 TABLET, FILM COATED ORAL at 12:31

## 2023-08-11 RX ADMIN — OMEPRAZOLE 20 MG: 20 CAPSULE, DELAYED RELEASE ORAL at 13:00

## 2023-08-11 RX ADMIN — SODIUM CHLORIDE: 9 INJECTION, SOLUTION INTRAVENOUS at 06:53

## 2023-08-11 RX ADMIN — CEFTRIAXONE SODIUM 1000 MG: 10 INJECTION, POWDER, FOR SOLUTION INTRAVENOUS at 06:43

## 2023-08-11 RX ADMIN — SODIUM CHLORIDE 250 MG: 9 INJECTION, SOLUTION INTRAVENOUS at 17:11

## 2023-08-11 RX ADMIN — CYANOCOBALAMIN TAB 500 MCG 1000 MCG: 500 TAB at 06:43

## 2023-08-11 RX ADMIN — SODIUM CHLORIDE, POTASSIUM CHLORIDE, SODIUM LACTATE AND CALCIUM CHLORIDE: 600; 310; 30; 20 INJECTION, SOLUTION INTRAVENOUS at 11:05

## 2023-08-11 RX ADMIN — SODIUM CHLORIDE 250 MG: 9 INJECTION, SOLUTION INTRAVENOUS at 06:53

## 2023-08-11 RX ADMIN — PROPOFOL 50 MG: 10 INJECTION, EMULSION INTRAVENOUS at 11:18

## 2023-08-11 RX ADMIN — FERROUS SULFATE TAB 325 MG (65 MG ELEMENTAL FE) 325 MG: 325 (65 FE) TAB at 06:42

## 2023-08-11 ASSESSMENT — ENCOUNTER SYMPTOMS
GASTROINTESTINAL NEGATIVE: 1
SINUS PAIN: 0
MYALGIAS: 1
DIARRHEA: 0
NERVOUS/ANXIOUS: 0
FOCAL WEAKNESS: 0
SORE THROAT: 0
COUGH: 0
SPEECH CHANGE: 0
NAUSEA: 0
RESPIRATORY NEGATIVE: 1
CONSTIPATION: 0
BLOOD IN STOOL: 0
FEVER: 0
ABDOMINAL PAIN: 1
PSYCHIATRIC NEGATIVE: 1
NEUROLOGICAL NEGATIVE: 1
WEAKNESS: 1
CARDIOVASCULAR NEGATIVE: 1
CHILLS: 0
HEADACHES: 1
FLANK PAIN: 0
VOMITING: 0
SHORTNESS OF BREATH: 0
EYES NEGATIVE: 1
FALLS: 1

## 2023-08-11 ASSESSMENT — COGNITIVE AND FUNCTIONAL STATUS - GENERAL
SUGGESTED CMS G CODE MODIFIER MOBILITY: CJ
CLIMB 3 TO 5 STEPS WITH RAILING: A LITTLE
DAILY ACTIVITIY SCORE: 24
SUGGESTED CMS G CODE MODIFIER DAILY ACTIVITY: CH
MOVING TO AND FROM BED TO CHAIR: A LITTLE
STANDING UP FROM CHAIR USING ARMS: A LITTLE
WALKING IN HOSPITAL ROOM: A LITTLE
MOBILITY SCORE: 20

## 2023-08-11 ASSESSMENT — PAIN DESCRIPTION - PAIN TYPE
TYPE: ACUTE PAIN
TYPE: SURGICAL PAIN
TYPE: ACUTE PAIN

## 2023-08-11 ASSESSMENT — COPD QUESTIONNAIRES
DURING THE PAST 4 WEEKS HOW MUCH DID YOU FEEL SHORT OF BREATH: NONE/LITTLE OF THE TIME
COPD SCREENING SCORE: 2
DO YOU EVER COUGH UP ANY MUCUS OR PHLEGM?: NO/ONLY WITH OCCASIONAL COLDS OR INFECTIONS
HAVE YOU SMOKED AT LEAST 100 CIGARETTES IN YOUR ENTIRE LIFE: NO/DON'T KNOW

## 2023-08-11 ASSESSMENT — LIFESTYLE VARIABLES: SUBSTANCE_ABUSE: 0

## 2023-08-11 NOTE — CONSULTS
Date of Consultation:  8/11/2023    Patient: : Es Evans  MRN: [id number]    Referring Physician:  Alexandria Waldrop MD     GI:TISH Kelly     Reason for Consultation: melena, anemia    History of Present Illness:     Patient is Sinhala speaking. Offered  but patient and family declined and request to translate for patient.     This is a 63 year old female with no reported significant past medical history other than gastritis in 2020 who was transferred from [facility] to [facility] on 8/10/2023 for melena, acute blood loss anemia secondary to upper GI bleeding.  Patient has reported fatigue for the last several weeks.  Family states that patient has been working a lot and had attributed fatigue to this.  She also complained of painful urination approximately 3 weeks prior to arrival for which she saw her primary care provider and was prescribed antibiotics.  She had relief of urinary symptoms but her fatigue/weakness progressively worsened.  She has had associated headaches, and loss of appetite.   She also endorses abdominal pain with with eating as well as taking medications.  No relieving or exacerbating factors. She denies melena, hematemesis, hematochezia.  On 8/10/2023, patient had a syncopal episode lasting approximately 3 minutes.  Per family, patient continued to breathe and did not lose pulses.  She was taken to [facility] where she had an episode of melena.  CT head negative at outside facility.  CT C-spine and abdomen negative for acute pathology.  Hemoglobin was found to be 6.2.  INR 1.2.  Potassium 3.0.  Troponin negative.  Lipase negative.  U tox negative.  Iron found to be 12 She was transfused with PRBCs, given fluids and abx for UTI, and then transferred to [facility] for higher level of care.  Repeat labs on arrival include WBC 10.7, hemoglobin 7.8, hematocrit 24.8, platelets 362, retic 2.7, CHEM panel unremarkable  including normal BUN.  Alk phos mildly elevated at 100.  Iron low at 38, TIBC 355, percent saturation 11, transferrin 293, ferritin 9.5.      Tobacco use: denies  Alcohol use: denies  Illicit drug use: denies    Patient reports EGD and colonoscopy in 2020 at [facility].  Colonoscopy reported to be negative  EGD patient was found to have gastritis.      NSAID/ASA use: Patient reports regular use of ibuprofen  Anticoagulation use: Denies      Past Medical History:   Diagnosis Date    Kidney stone          Past Surgical History:   Procedure Laterality Date    CYSTO STENT PLACEMNT PRE SURG Right 6/14/2017    Procedure: CYSTO STENT PLACEMENT PRE SURGERY;  Surgeon: Asad Silva M.D.;  Location: SURGERY [facility];  Service:     LITHOTRIPSY         No family history on file.    Social History     Socioeconomic History    Marital status:    Tobacco Use    Smoking status: Never   Substance and Sexual Activity    Alcohol use: No    Drug use: No       Review of systems:  Review of Systems   Constitutional:  Positive for malaise/fatigue. Negative for chills and fever.   HENT:  Negative for congestion, sinus pain and sore throat.    Respiratory:  Negative for cough and shortness of breath.    Cardiovascular:  Negative for chest pain and leg swelling.   Gastrointestinal:  Positive for abdominal pain (with oral intake) and melena. Negative for blood in stool, constipation, diarrhea, nausea and vomiting.   Genitourinary:  Negative for dysuria and flank pain.   Musculoskeletal:  Positive for falls (s/p syncope) and myalgias.   Neurological:  Positive for weakness and headaches. Negative for speech change and focal weakness.   Psychiatric/Behavioral:  Negative for substance abuse. The patient is not nervous/anxious.    All other systems reviewed and are negative.        Physical Exam:  Vitals:    08/10/23 2025 08/10/23 2330 08/11/23 0422 08/11/23 0805   BP:  100/63 126/77 135/74   Pulse:  77 80 72    Resp:  16 18 18   Temp:  37 °C (98.6 °F) 36.3 °C (97.3 °F) 37.1 °C (98.8 °F)   TempSrc:  Temporal Temporal Temporal   SpO2: 92% 96% 97% 95%   Weight:       Height:           Physical Exam  Vitals and nursing note reviewed.   Constitutional:       General: She is awake.      Appearance: She is overweight. She is ill-appearing.   HENT:      Head: Normocephalic.      Nose: Nose normal. No congestion.      Mouth/Throat:      Mouth: Mucous membranes are moist.      Pharynx: Oropharynx is clear. No oropharyngeal exudate.   Eyes:      General: No scleral icterus.     Extraocular Movements: Extraocular movements intact.      Conjunctiva/sclera: Conjunctivae normal.   Cardiovascular:      Rate and Rhythm: Normal rate and regular rhythm.      Pulses: Normal pulses.      Heart sounds: Normal heart sounds. No murmur heard.  Pulmonary:      Effort: Pulmonary effort is normal. No respiratory distress.      Breath sounds: Normal breath sounds. No wheezing.   Abdominal:      General: Abdomen is flat. Bowel sounds are normal. There is no distension.      Palpations: Abdomen is soft.      Tenderness: There is no abdominal tenderness. There is no guarding.   Musculoskeletal:      Right lower leg: No edema.      Left lower leg: No edema.   Skin:     General: Skin is warm and dry.      Capillary Refill: Capillary refill takes less than 2 seconds.      Coloration: Skin is pale.   Neurological:      General: No focal deficit present.      Mental Status: She is alert and oriented to person, place, and time. Mental status is at baseline.      Motor: No weakness.   Psychiatric:         Mood and Affect: Mood normal.         Behavior: Behavior normal. Behavior is cooperative.         Thought Content: Thought content normal.         Judgment: Judgment normal.           Labs:  Recent Labs     08/10/23  2112 08/11/23  0118   WBC 10.7 8.5   RBC 2.90* 2.77*   HEMOGLOBIN 7.8* 7.2*   HEMATOCRIT 24.8* 23.6*   MCV 85.5 85.2   MCH 26.9* 26.0*   MCHC  31.5* 30.5*   RDW 54.3* 53.1*   PLATELETCT 362 428   MPV 8.2* 8.2*     Recent Labs     08/10/23  2112 08/11/23  0118   SODIUM 141 143   POTASSIUM 3.6 3.7   CHLORIDE 109 111   CO2 21 22   GLUCOSE 106* 98   BUN 16 14     Recent Labs     08/10/23  2112   INR 1.15*       Recent Labs     08/10/23  2112 08/11/23  0118   ASTSGOT 17 14   ALTSGPT 9 6   TBILIRUBIN 0.3 0.3   ALKPHOSPHAT 100* 96   GLOBULIN 2.7 2.5   INR 1.15*  --          Imaging:  [id number] VIEW  Narrative: 6/28/2017 10:38 AM    HISTORY/REASON FOR EXAM:  History of renal calculus.    TECHNIQUE/EXAM DESCRIPTION AND NUMBER OF VIEWS:   1 views of the abdomen.    COMPARISON: 6/14/2017    FINDINGS:  There is a right-sided ureteral stent. A linear calcification overlying the right aspect of S1 may be related to degenerative changes but could represent the previously noted ureteral calculus. There is a moderate amount of stool in the ascending colon.   There is no evidence of bowel obstruction.  Impression: Calcification along the right aspect of S1 could be related to degenerative changes within the spine but could represent the previously noted ureteral calculus.    Right ureteral stent is seen.            Impressions:  Melena  Acute Blood Loss Anemia  Iron deficiency anemia  Fatigue  Urinary tract infection  History of gastritis    MDM:  This is a pleasant 63-year-old female with a past medical history as listed above who presents to [facility] with melena, acute blood loss anemia.  Patient experienced syncopal event and was taken to [facility] where she had an episode of melena.  Workup included hemoglobin of 6.2.  She was transfused with PRBCs and transferred for higher level of care. WBC 10.7, hemoglobin 7.8, hematocrit 24.8, platelets 362, retic 2.7, CHEM panel unremarkable.  Iron low at 38, TIBC 355, percent saturation 11, transferrin 293, ferritin 9.5.  INR outside facility 1.2.  Discussed findings and concerns for upper GI  bleeding as well as further evaluation/possible treatment with EGD.  Patient is agreeable to proceed.  She has been n.p.o. since last night.     Recommendations:  Monitor H/H and for signs of overt bleeding-transfuse as necessary  Continue PPI twice daily  Maintain n.p.o. status.  Plan for EGD today-further recs to follow    Discussed with patient, family at bedside, nursing, Dr. Waldrop, Dr. Torres        This note was generated using voice recognition software which has a small chance of producing errors of grammar and possibly content. I have made every reasonable attempt to find and correct any obvious errors, but expect that some may not be found prior to finalization of this note.

## 2023-08-11 NOTE — ANESTHESIA PREPROCEDURE EVALUATION
Case: [id number] Date/Time: 08/11/23 1120    Procedures:       GASTROSCOPY (Esophagus)      GASTROSCOPY, WITH BIOPSY (Esophagus)    Anesthesia type: MAC    Pre-op diagnosis: melena, anemia    Location: [facility] ROOM 26 / SURGERY SAME DAY [facility]    Surgeons: Asad Torres M.D.      63 y  GI bleed    P Med Hx:  Kidney stone    P Surg Hx:  Cysto litho 2017  No reported problems with previous anethesia    Non-smoker    NPO    Relevant Problems      (positive) Hydronephrosis   (positive) Right nephrolithiasis       Physical Exam    Airway   Mallampati: II  TM distance: >3 FB  Neck ROM: full       Cardiovascular - normal exam  Rhythm: regular  Rate: normal  (-) murmur     Dental - normal exam           Pulmonary - normal exam  Breath sounds clear to auscultation     Abdominal    Neurological - normal exam                 Anesthesia Plan    ASA 2       Plan - MAC               Induction: intravenous      Pertinent diagnostic labs and testing reviewed    Informed Consent:    Anesthetic plan and risks discussed with patient.    Use of blood products discussed with: patient whom consented to blood products.

## 2023-08-11 NOTE — CARE PLAN
The patient is Stable - Low risk of patient condition declining or worsening    Shift Goals  Clinical Goals: plan of care  Patient Goals: rest  Family Goals: plan of care, rest for the patient    Progress made toward(s) clinical / shift goals:  Plan for pt to have a GI consult in regards to recurrent GI bleeds. Pt has not complained of pain during the shift and has been resting comfortably.    Patient is not progressing towards the following goals:

## 2023-08-11 NOTE — PROGRESS NOTES
Hospital Medicine Daily Progress Note    Date of Service  8/11/2023    Chief Complaint  Es Evans is a 63 y.o. female admitted 8/10/2023 with syncope    Hospital Course  64 yo w/ hx of PUD, recent use of NSAIDs for headaches, presented to [facility] with unwitnessed syncope found to be severely anemic with Hb of 6 s/p 1 unit of pRBC. CTH and CT C spine neg. CT A/P without acute findingds. Started on IV PPI and transferred to [facility] for an EGD. EGD 8/10 by Dr. Torres showed focal erythema and erosions of gastric fundus that would not explain an anemia this severe. Pt was HD stable, bowel movements normal, thus colonoscopy was deferred to outpatient. Started on iron tabs    Interval Problem Update  - headache/neck pain  - EGD w/ focal gastric erosions  - hb now in 9s      I have discussed this patient's plan of care and discharge plan at IDT rounds today with Case Management, Nursing, Nursing leadership, and other members of the IDT team.    Consultants/Specialty  GI    Code Status  Full Code    Disposition  The patient is not medically cleared for discharge to home or a post-acute facility.  Anticipate discharge to: home with close outpatient follow-up    I have placed the appropriate orders for post-discharge needs.    Review of Systems  Review of Systems   All other systems reviewed and are negative.       Physical Exam  Temp:  [36.3 °C (97.3 °F)-37.1 °C (98.8 °F)] 36.8 °C (98.2 °F)  Pulse:  [63-81] 68  Resp:  [16-39] 19  BP: (100-156)/(58-79) 110/63  SpO2:  [91 %-99 %] 91 %    Physical Exam  General: NAD, resting comfortably  HEENT: PERRLA, EOMI  Cards: RRR, no murmur or gallops, no tenderness of chest wall, JVP nl  Pulm: normal respiratory effort, CTAB, no wheezes or rhonchi  Abdomen: soft, NTND, + bowel sounds, no rebound tenderness or guarding  MSK: normal ROM of upper and lower extremities  Neuro: CN II-XII grossly intact, sensation/strength intact, AAOx3  Psych: Appropriate mood   Fluids    Intake/Output  Summary (Last 24 hours) at 8/11/2023 1553  Last data filed at 8/11/2023 1130  Gross per 24 hour   Intake 697.5 ml   Output 1 ml   Net 696.5 ml       Laboratory  Recent Labs     08/10/23  2112 08/11/23  0118 08/11/23  1250   WBC 10.7 8.5  --    RBC 2.90* 2.77*  --    HEMOGLOBIN 7.8* 7.2* 9.1*   HEMATOCRIT 24.8* 23.6* 29.0*   MCV 85.5 85.2  --    MCH 26.9* 26.0*  --    MCHC 31.5* 30.5*  --    RDW 54.3* 53.1*  --    PLATELETCT 362 428  --    MPV 8.2* 8.2*  --      Recent Labs     08/10/23  2112 08/11/23  0118   SODIUM 141 143   POTASSIUM 3.6 3.7   CHLORIDE 109 111   CO2 21 22   GLUCOSE 106* 98   BUN 16 14   CREATININE 0.68 0.68   CALCIUM 8.9 8.5     Recent Labs     08/10/23  2112   INR 1.15*               Imaging  DX-CERVICAL SPINE-2 OR 3 VIEWS    (Results Pending)        Assessment/Plan  * GIB (gastrointestinal bleeding)  Assessment & Plan  Pt has hx of PUD, recent frequent use of NSAIDs pw syncope 2/2 severe anemia  OSH Hb in 6s s/p 1 unit of pRBC  GI consulted, Dr. Torres performed EGD 8/11 which showed gastric fundus erosions which wouldn't explain anemia severity  Given stable conditio, no obvious melena in house, will recommend outpatient colonoscopy  Start iron tabs BID  Continue PPI, change to PO  Resume diet    Syncope  Assessment & Plan  2/2 severe anemia  Work up as above    Acute blood loss anemia  Assessment & Plan  Hb baseline in 11s , was in 6s at OSH now stable in 7s  Sp 1 unit pRBC  EGD 8/11 w/ focal erythema and erosions of gastric fundus, not severe enough to explain degree of anemia  Plan for outpatient colonoscopy  Trend H/H q24hr  Transfuse for Hb<7    Headache  Assessment & Plan  Has been taking nsaids regularly for headaches  Currently w/ neck pain post fall  Obtain cervical spine xr  CTH at OSH neg for bleed/acute findingds  Avoid NSAIDS  Tylenol PRN      Complicated UTI (urinary tract infection)- (present on admission)  Assessment & Plan  Found to have UTI at outside facility  Rocephin  given from outside facility  Continue tr  Will ask unit clerk to obtain OSH UCX         VTE prophylaxis: SCDs    I have performed a physical exam and reviewed and updated ROS and Plan today (8/11/2023). In review of yesterday's note (8/10/2023), there are no changes except as documented above.    ADDENDUM: UCX from [facility] : 50k GNR

## 2023-08-11 NOTE — PROGRESS NOTES
Report received from [facility] RN. Pt transported with medical transport. Daughter at bedside. All belongings accounted for.  Assessment complete.  A&O x 4. Patient calls appropriately.  Patient ambulates with x1 assist  Patient has 0/10 pain.  Denies N&V. Pt on a clear liquid diet.  + void, + flatus, - BM.  Patient denies SOB.  Review plan with of care with patient. Call light and personal belongings within reach. Hourly rounding in place. All needs met at this time.

## 2023-08-11 NOTE — PROGRESS NOTES
"[facility]PITAL[facility] TRIAGE OFFICER DIRECT ADMISSION REPORT  Transferring facility: [facility]  Transferring physician: Dr Boone  Chief complaint: GIB  Pertinent history & patient course: 63-year-old female presented ER with unwitnessed syncopal episode.  No acute findings noted on CT head and CT C-spine or CT AP.  Noted to have hemoglobin of 6.0 with bloody stool, 1 unit being transfused. Given protonix. Reported to have unrevealing colonoscopy and EGD several years ago. transfer being requested for GI evaluation.    Pertinent imaging & lab results: Hemoglobin 6.0, BUN 21, creatinine 0.8, INR 1.2.  CT head no acute bleeding, CT C-spine no fracture, CTA.  No acute findings  Code Status:  per transferring provider, I personally verified with the transferring provider patient's code status and the transferring provider has confirmed this with the patient.  Further work up or recommendations per triage officer prior to transfer: anemia workup, transfuse as needed, PPI  Consultants called prior to transfer and pertinent input from consultants: NA  Patient accepted for transfer: Yes  Consultants to be called upon arrival: GI in AM  Admission status: Inpatient.   Floor requested: medsur  If ICU transfer, name of intensivist case discussed with and pertinent input from critical care:     Please inform the \"Triage Coord.-RTOC\" through [employer] upon arrival of the patient to [facility] for assignment of a hospitalist to perform admission. Reach out to the assigned hospitalist (assigned by RTOC) for any questions or concerns regarding the care of this patient.           " no chest pain/no palpitations/no dyspnea on exertion

## 2023-08-11 NOTE — PROGRESS NOTES
"Bedside report received.  Assessment complete.  A&O x 4. Patient calls appropriately.  Patient ambulates with standby assist. Bed alarm off.   Patient has 0/10 pain. Pain managed with prescribed medications.  Denies N&V. NPO diet.  Administered 1 unit RBCs  + void, + flatus, last BM PTA.  Patient denies SOB.  SCD's refused.  Patient is pleasant and cooperative with the care plan.  Review plan with of care with patient. Call light and personal belongings within reach. Hourly rounding in place. All needs met at this time.   /60   Pulse 70   Temp 36.6 °C (97.8 °F) (Temporal)   Resp (!) 39   Ht 1.575 m (5' 2\")   Wt 67.1 kg (147 lb 14.9 oz)   SpO2 92%   BMI 27.06 kg/m²     "

## 2023-08-11 NOTE — ASSESSMENT & PLAN NOTE
Pt has hx of PUD, recent frequent use of NSAIDs pw syncope 2/2 severe anemia  OSH Hb in 6s s/p 1 unit of pRBC  GI consulted, Dr. Torres performed EGD 8/11 which showed gastric fundus erosions which wouldn't explain anemia severity  Given stable conditio, no obvious melena in house, will recommend outpatient colonoscopy  Start iron tabs BID  Continue PPI, change to PO  Resume diet

## 2023-08-11 NOTE — ANESTHESIA POSTPROCEDURE EVALUATION
Patient: Es Evans    Procedure Summary     Date: 08/11/23 Room / Location: [facility] ROOM 26 / SURGERY SAME DAY [facility]    Anesthesia Start: 1107 Anesthesia Stop: 1130    Procedures:       GASTROSCOPY (Esophagus)      GASTROSCOPY, WITH BIOPSY (Esophagus) Diagnosis: (EROSIVE GASTRITIS)    Surgeons: Asad Torres M.D. Responsible Provider: Shaquille Jaimes M.D.    Anesthesia Type: MAC ASA Status: 2          Final Anesthesia Type: MAC  Last vitals  BP   Blood Pressure : (!) 156/76    Temp   36.8 °C (98.2 °F)    Pulse   68   Resp   18    SpO2   95 %      Anesthesia Post Evaluation    Patient location during evaluation: PACU  Patient participation: complete - patient participated  Level of consciousness: awake and alert    Airway patency: patent  Anesthetic complications: no  Cardiovascular status: hemodynamically stable  Respiratory status: acceptable  Hydration status: euvolemic    PONV: none          No notable events documented.     Nurse Pain Score: 8 (NPRS)

## 2023-08-11 NOTE — ASSESSMENT & PLAN NOTE
Hb baseline in 11s , was in 6s at OSH now stable in 7s  Sp 1 unit pRBC  EGD 8/11 w/ focal erythema and erosions of gastric fundus, not severe enough to explain degree of anemia  Plan for outpatient colonoscopy  Trend H/H q24hr  Transfuse for Hb<7

## 2023-08-11 NOTE — H&P
Hospital Medicine History & Physical Note    Date of Service  8/11/2023    Primary Care Physician  Pcp Pt States None    Consultants  None    Code Status  Full Code    Chief Complaint  GIB     History of Presenting Illness  Es Evans is a 63 y.o. female who presented 8/10/2023 with a syncopal episode.  Patient noted to have hemoglobin 6.2 at outside facility.  She denies any hematemesis/melena/hematochezia.  Patient had an endoscopy/colonoscopy for melena 3 years ago.  She was told that she had an ulcer at the time.      Of note, patient has been taking ibuprofen several times a day recently for headaches.    Patient presented to outside facility ER for a syncopal episode.  Sugar checked at that time was 143.  CT head negative at outside facility.  CT C-spine and abdomen negative for acute pathology.  Hemoglobin was found to be 6.2.  INR 1.2.  Potassium 3.0.  Troponin negative.  Lipase negative.  U tox negative.  Iron found to be 12.  UA positive for UTI (cloudy urine, increase WBC, bacteruria). Patient was given rocephin, fluids, and a unit of blood prior to transfer.     I discussed the plan of care with patient.    Review of Systems  Review of Systems   Constitutional:  Positive for malaise/fatigue.   HENT: Negative.     Eyes: Negative.    Respiratory: Negative.     Cardiovascular: Negative.    Gastrointestinal: Negative.    Genitourinary: Negative.    Musculoskeletal:  Positive for falls.   Skin: Negative.    Neurological: Negative.    Endo/Heme/Allergies: Negative.    Psychiatric/Behavioral: Negative.         Past Medical History   has a past medical history of Kidney stone.    Surgical History   has a past surgical history that includes lithotripsy and cysto stent placemnt pre surg (Right, 6/14/2017).     Family History   Family history reviewed with patient. There is no family history that is pertinent to the chief complaint.     Social History   reports that she has never smoked. She does not have any  smokeless tobacco history on file. She reports that she does not drink alcohol and does not use drugs.    Allergies  No Known Allergies    Medications  Prior to Admission Medications   Prescriptions Last Dose Informant Patient Reported? Taking?   ondansetron (ZOFRAN ODT) 4 MG TABLET DISPERSIBLE   No No   Sig: Take 1 Tab by mouth every 6 hours as needed for Nausea/Vomiting.      Facility-Administered Medications: None       Physical Exam  Temp:  [36.7 °C (98.1 °F)-37 °C (98.6 °F)] 37 °C (98.6 °F)  Pulse:  [77-81] 77  Resp:  [16] 16  BP: (100-126)/(63-77) 100/63  SpO2:  [92 %-96 %] 96 %  Blood Pressure : 100/63   Temperature: 37 °C (98.6 °F)   Pulse: 77   Respiration: 16   Pulse Oximetry: 96 %       Physical Exam  Constitutional:       Appearance: Normal appearance. She is normal weight.   HENT:      Head: Normocephalic.      Nose: Nose normal.      Mouth/Throat:      Mouth: Mucous membranes are moist.   Eyes:      Pupils: Pupils are equal, round, and reactive to light.   Cardiovascular:      Rate and Rhythm: Normal rate and regular rhythm.      Pulses: Normal pulses.   Pulmonary:      Effort: Pulmonary effort is normal.      Breath sounds: Normal breath sounds.   Abdominal:      General: Abdomen is flat. Bowel sounds are normal.      Palpations: Abdomen is soft.   Musculoskeletal:         General: Normal range of motion.      Cervical back: Neck supple.   Skin:     General: Skin is warm.   Neurological:      General: No focal deficit present.      Mental Status: She is alert and oriented to person, place, and time. Mental status is at baseline.   Psychiatric:         Mood and Affect: Mood normal.         Behavior: Behavior normal.         Thought Content: Thought content normal.         Judgment: Judgment normal.         Laboratory:  Recent Labs     08/10/23  2112   WBC 10.7   RBC 2.90*   HEMOGLOBIN 7.8*   HEMATOCRIT 24.8*   MCV 85.5   MCH 26.9*   MCHC 31.5*   RDW 54.3*   PLATELETCT 362   MPV 8.2*     Recent Labs      08/10/23  2112   SODIUM 141   POTASSIUM 3.6   CHLORIDE 109   CO2 21   GLUCOSE 106*   BUN 16   CREATININE 0.68   CALCIUM 8.9     Recent Labs     08/10/23  2112   ALTSGPT 9   ASTSGOT 17   ALKPHOSPHAT 100*   TBILIRUBIN 0.3   GLUCOSE 106*     Recent Labs     08/10/23  2112   INR 1.15*     No results for input(s): NTPROBNP in the last 72 hours.      No results for input(s): TROPONINT in the last 72 hours.    Imaging:  No orders to display       no X-Ray or EKG requiring interpretation    Assessment/Plan:  Justification for Admission Status  I anticipate this patient will require at least two midnights for appropriate medical management, necessitating inpatient admission because syncope secondary to anemia     Patient will need a Med/Surg bed on MEDICAL service .  The need is secondary to GIB.    * GIB (gastrointestinal bleeding)  Assessment & Plan  Chart reviewed from outside facility.  Patient with history of ulcer and currently on ibuprofen presents to ED for syncope secondary to anemia.  Patient has been transfused 1 unit of blood with improvement in hemoglobin.  Patient will be monitored overnight for fluids, Protonix, transfusion per protocol.  Patient will be started on iron GI consult in AM.  Monitor for fluid overload from fluid administration    Complicated UTI (urinary tract infection)- (present on admission)  Assessment & Plan  Found to have UTI at outside facility  Rocephin given from outside facility  Continue rocephin        VTE prophylaxis: pharmacologic prophylaxis contraindicated due to gib     Total time spent on visit 90 minutes reviewing records from outside facility, discussing goals of care, plan, treatment, prognosis

## 2023-08-11 NOTE — ANESTHESIA TIME REPORT
Anesthesia Start and Stop Event Times     Date Time Event    8/11/2023 1105 Ready for Procedure     1107 Anesthesia Start     1130 Anesthesia Stop        Responsible Staff  08/11/23    Name Role Begin End    Shaquille Jaimes M.D. Anesth 1107 1130        Overtime Reason:  no overtime (within assigned shift)    Comments:

## 2023-08-11 NOTE — PROGRESS NOTES
.4 Eyes Skin Assessment Completed by SIERRA Dupont and SIERRA Anglin.    Head WDL  Ears WDL  Nose WDL  Mouth WDL  Neck WDL  Breast/Chest WDL  Shoulder Blades WDL  Spine WDL  (R) Arm/Elbow/Hand WDL  (L) Arm/Elbow/Hand WDL  Abdomen WDL  Groin WDL  Scrotum/Coccyx/Buttocks WDL  (R) Leg WDL  (L) Leg WDL  (R) Heel/Foot/Toe WDL  (L) Heel/Foot/Toe WDL          Devices In Places Pulse Ox and Nasal Cannula      Interventions In Place Gray Ear Foams, NC W/Ear Foams, Pillows, and Pressure Redistribution Mattress    Possible Skin Injury No    Pictures Uploaded Into Epic N/A  Wound Consult Placed N/A  RN Wound Prevention Protocol Ordered No

## 2023-08-11 NOTE — ASSESSMENT & PLAN NOTE
Has been taking nsaids regularly for headaches  Currently w/ neck pain post fall  Obtain cervical spine xr  CTH at OSH neg for bleed/acute findingds  Avoid NSAIDS  Tylenol PRN

## 2023-08-11 NOTE — CARE PLAN
Problem: Knowledge Deficit - Standard  Goal: Patient and family/care givers will demonstrate understanding of plan of care, disease process/condition, diagnostic tests and medications  Outcome: Progressing     Problem: Pain - Standard  Goal: Alleviation of pain or a reduction in pain to the patient’s comfort goal  Outcome: Progressing   The patient is Stable - Low risk of patient condition declining or worsening    Shift Goals  Clinical Goals: administer PRBCs, patient safety  Patient Goals: rest  Family Goals: plan of care, rest for the patient    Progress made toward(s) clinical / shift goals:  RBC's administered - patient tolerated well. Patient remained safe from injury.    Patient is not progressing towards the following goals:

## 2023-08-11 NOTE — ASSESSMENT & PLAN NOTE
Found to have UTI at outside facility  Rocephin given from outside facility  Continue rocephin  Will ask unit clerk to obtain OSH UCX

## 2023-08-11 NOTE — OR NURSING
1126- Pt arrived to PACU, report received.  Pt on 8L mask.      1138- Pt tolerating sips of sprite at this time.     1145- Report called to SIERRA Patel.  Pt placed on transport.     1210- Pt transported out of PACU and back to room via transport.

## 2023-08-11 NOTE — OR SURGEON
Immediate Post OP Note    PreOp Diagnosis: Iron deficiency anemia, ?melena      PostOp Diagnosis: Erosive gatropathy, small HH      Procedure(s):  GASTROSCOPY - Wound Class: Clean Contaminated  GASTROSCOPY, WITH BIOPSY - Wound Class: Clean Contaminated    Surgeon(s):  Asad Torres M.D.    Anesthesiologist/Type of Anesthesia:  Anesthesiologist: Shaquille Jaimes M.D./MOMO    Surgical Staff:  Endoscopy Technician: Maeve Calvert  Endoscopy Nurse: Annette Jamil R.N.; Gianluca Reed R.N.    Specimens removed if any:  ID Type Source Tests Collected by Time Destination   A :  Tissue Duodenum PATHOLOGY SPECIMEN Asad Torres M.D. 8/11/2023 11:13 AM    B :  Tissue Gastric PATHOLOGY SPECIMEN Asad Torres M.D. 8/11/2023 11:14 AM    C : fundus Tissue Gastric PATHOLOGY SPECIMEN Asad Torres M.D. 8/11/2023 11:19 AM        Estimated Blood Loss: None    Findings:   Focal area of erythema and erosions gastric fundus, soft.  Multiple biopsies obtained and friable.  Small 2 cm hiatal hernia  Random biopsies obtained for H pylori and celiac    Complications: None        8/11/2023 11:23 AM Asad Torres M.D.

## 2023-08-12 ENCOUNTER — PHARMACY VISIT (OUTPATIENT)
Dept: PHARMACY | Facility: MEDICAL CENTER | Age: 63
End: 2023-08-12
Payer: COMMERCIAL

## 2023-08-12 VITALS
DIASTOLIC BLOOD PRESSURE: 67 MMHG | RESPIRATION RATE: 16 BRPM | OXYGEN SATURATION: 93 % | TEMPERATURE: 98.1 F | WEIGHT: 147.93 LBS | SYSTOLIC BLOOD PRESSURE: 116 MMHG | HEART RATE: 66 BPM | HEIGHT: 62 IN | BODY MASS INDEX: 27.22 KG/M2

## 2023-08-12 LAB
ANION GAP SERPL CALC-SCNC: 11 MMOL/L (ref 7–16)
BUN SERPL-MCNC: 10 MG/DL (ref 8–22)
CALCIUM SERPL-MCNC: 8.7 MG/DL (ref 8.5–10.5)
CHLORIDE SERPL-SCNC: 109 MMOL/L (ref 96–112)
CO2 SERPL-SCNC: 21 MMOL/L (ref 20–33)
CREAT SERPL-MCNC: 0.76 MG/DL (ref 0.5–1.4)
ERYTHROCYTE [DISTWIDTH] IN BLOOD BY AUTOMATED COUNT: 54.4 FL (ref 35.9–50)
GFR SERPLBLD CREATININE-BSD FMLA CKD-EPI: 87 ML/MIN/1.73 M 2
GLUCOSE SERPL-MCNC: 91 MG/DL (ref 65–99)
HAPTOGLOB SERPL-MCNC: 164 MG/DL (ref 30–200)
HCT VFR BLD AUTO: 27.2 % (ref 37–47)
HGB BLD-MCNC: 8.5 G/DL (ref 12–16)
MCH RBC QN AUTO: 26.6 PG (ref 27–33)
MCHC RBC AUTO-ENTMCNC: 31.3 G/DL (ref 32.2–35.5)
MCV RBC AUTO: 85.3 FL (ref 81.4–97.8)
PLATELET # BLD AUTO: 427 K/UL (ref 164–446)
PMV BLD AUTO: 8.1 FL (ref 9–12.9)
POTASSIUM SERPL-SCNC: 3.5 MMOL/L (ref 3.6–5.5)
RBC # BLD AUTO: 3.19 M/UL (ref 4.2–5.4)
SODIUM SERPL-SCNC: 141 MMOL/L (ref 135–145)
WBC # BLD AUTO: 7.6 K/UL (ref 4.8–10.8)

## 2023-08-12 PROCEDURE — 700105 HCHG RX REV CODE 258: Performed by: STUDENT IN AN ORGANIZED HEALTH CARE EDUCATION/TRAINING PROGRAM

## 2023-08-12 PROCEDURE — 80048 BASIC METABOLIC PNL TOTAL CA: CPT

## 2023-08-12 PROCEDURE — 85027 COMPLETE CBC AUTOMATED: CPT

## 2023-08-12 PROCEDURE — 700102 HCHG RX REV CODE 250 W/ 637 OVERRIDE(OP): Performed by: STUDENT IN AN ORGANIZED HEALTH CARE EDUCATION/TRAINING PROGRAM

## 2023-08-12 PROCEDURE — 99239 HOSP IP/OBS DSCHRG MGMT >30: CPT | Performed by: INTERNAL MEDICINE

## 2023-08-12 PROCEDURE — 700102 HCHG RX REV CODE 250 W/ 637 OVERRIDE(OP): Mod: JZ | Performed by: INTERNAL MEDICINE

## 2023-08-12 PROCEDURE — 700111 HCHG RX REV CODE 636 W/ 250 OVERRIDE (IP): Mod: JZ | Performed by: STUDENT IN AN ORGANIZED HEALTH CARE EDUCATION/TRAINING PROGRAM

## 2023-08-12 PROCEDURE — 700102 HCHG RX REV CODE 250 W/ 637 OVERRIDE(OP): Performed by: INTERNAL MEDICINE

## 2023-08-12 PROCEDURE — A9270 NON-COVERED ITEM OR SERVICE: HCPCS | Performed by: STUDENT IN AN ORGANIZED HEALTH CARE EDUCATION/TRAINING PROGRAM

## 2023-08-12 PROCEDURE — RXMED WILLOW AMBULATORY MEDICATION CHARGE: Performed by: INTERNAL MEDICINE

## 2023-08-12 PROCEDURE — A9270 NON-COVERED ITEM OR SERVICE: HCPCS | Performed by: INTERNAL MEDICINE

## 2023-08-12 PROCEDURE — A9270 NON-COVERED ITEM OR SERVICE: HCPCS | Mod: JZ | Performed by: INTERNAL MEDICINE

## 2023-08-12 PROCEDURE — 700111 HCHG RX REV CODE 636 W/ 250 OVERRIDE (IP): Performed by: STUDENT IN AN ORGANIZED HEALTH CARE EDUCATION/TRAINING PROGRAM

## 2023-08-12 RX ORDER — OMEPRAZOLE 20 MG/1
20 CAPSULE, DELAYED RELEASE ORAL DAILY
Qty: 30 CAPSULE | Refills: 0 | Status: SHIPPED | OUTPATIENT
Start: 2023-08-13 | End: 2023-09-12

## 2023-08-12 RX ORDER — POTASSIUM CHLORIDE 20 MEQ/1
40 TABLET, EXTENDED RELEASE ORAL ONCE
Status: COMPLETED | OUTPATIENT
Start: 2023-08-12 | End: 2023-08-12

## 2023-08-12 RX ORDER — SUCRALFATE 1 G/1
1 TABLET ORAL EVERY 6 HOURS
Qty: 56 TABLET | Refills: 0 | Status: SHIPPED | OUTPATIENT
Start: 2023-08-12 | End: 2023-08-26

## 2023-08-12 RX ORDER — FERROUS SULFATE 325(65) MG
325 TABLET ORAL
Qty: 30 TABLET | Refills: 0 | Status: SHIPPED | OUTPATIENT
Start: 2023-08-13 | End: 2023-09-12

## 2023-08-12 RX ORDER — LIDOCAINE 50 MG/G
1 PATCH TOPICAL EVERY 24 HOURS
Qty: 10 PATCH | Refills: 0 | Status: SHIPPED | OUTPATIENT
Start: 2023-08-12 | End: 2023-08-22

## 2023-08-12 RX ORDER — BUTALBITAL, ACETAMINOPHEN AND CAFFEINE 50; 325; 40 MG/1; MG/1; MG/1
2 TABLET ORAL EVERY 6 HOURS PRN
Qty: 12 TABLET | Refills: 0 | Status: SHIPPED | OUTPATIENT
Start: 2023-08-12 | End: 2023-08-15

## 2023-08-12 RX ADMIN — SODIUM CHLORIDE 250 MG: 9 INJECTION, SOLUTION INTRAVENOUS at 06:52

## 2023-08-12 RX ADMIN — OMEPRAZOLE 20 MG: 20 CAPSULE, DELAYED RELEASE ORAL at 06:32

## 2023-08-12 RX ADMIN — FOLIC ACID 1 MG: 1 TABLET ORAL at 06:33

## 2023-08-12 RX ADMIN — ACETAMINOPHEN 650 MG: 325 TABLET, FILM COATED ORAL at 06:34

## 2023-08-12 RX ADMIN — FERROUS SULFATE TAB 325 MG (65 MG ELEMENTAL FE) 325 MG: 325 (65 FE) TAB at 08:28

## 2023-08-12 RX ADMIN — CYANOCOBALAMIN TAB 500 MCG 1000 MCG: 500 TAB at 06:32

## 2023-08-12 RX ADMIN — CEFTRIAXONE SODIUM 1000 MG: 10 INJECTION, POWDER, FOR SOLUTION INTRAVENOUS at 06:48

## 2023-08-12 RX ADMIN — POTASSIUM CHLORIDE 40 MEQ: 1500 TABLET, EXTENDED RELEASE ORAL at 09:27

## 2023-08-12 RX ADMIN — SENNOSIDES AND DOCUSATE SODIUM 2 TABLET: 50; 8.6 TABLET ORAL at 06:33

## 2023-08-12 RX ADMIN — SUCRALFATE 1 G: 1 TABLET ORAL at 06:32

## 2023-08-12 ASSESSMENT — PAIN DESCRIPTION - PAIN TYPE
TYPE: ACUTE PAIN

## 2023-08-12 NOTE — PROGRESS NOTES
"Bedside report received.  Assessment complete.  A&O x 4. Patient calls appropriately.  Patient ambulates with no assist. Bed alarm off.   Patient has 0/10 pain. Pain managed with prescribed medications.  Denies N&V. Tolerating regular diet.  + void, + flatus, last BM PTA.  Patient denies SOB.  SCD's off.  Patient is pleasant and cooperative with the care plan. Discharging home.  Review plan with of care with patient. Call light and personal belongings within reach. Hourly rounding in place. All needs met at this time.   /67   Pulse 66   Temp 36.7 °C (98.1 °F) (Temporal)   Resp 16   Ht 1.575 m (5' 2\")   Wt 67.1 kg (147 lb 14.9 oz)   SpO2 93%   BMI 27.06 kg/m²     "

## 2023-08-12 NOTE — PROGRESS NOTES
Report received from AM RN; assumed care. Family bedside at change of shift, approval for family member to stay overnight per report. Assessment complete. A&O x 4. VSS. 90-93% on RA. Patient denying SOB, numbness, tingling, nausea, vomiting, dizziness. Medicated for heart burn/pain; see MAR. Posterior head/neck pain and heartburn reported during assessment. On call hospitalist contacted for Tums order; received/administered. Heat packs being utilized for back of neck. Abdomen round, tender upper abdominal region. Hypoactive BS x 4 noted. + void. + eructation. - flatus (states doesn't have much flatus prior to arrival. LBM 08/11. Patient tolerating regular diet, not with much of an appetite. Family reported patient doesn't eat much prior to hospitalization. Patient up SBA with steady gait noted. Discussed plan of care with patient. All questions answered.  Low fall risk. Bed in locked/lowest position.  Call light/personal belongings within reach.  All needs met, patient resting at present time.

## 2023-08-12 NOTE — PROGRESS NOTES
Discharge Lounge order placed and patient educated. Care plan and patient education completed. All belongings returned to patient. Medication delivered to bedside. Patient transported via wheelchair to discharge lounge. Family instructed to pickup patient at [facility].

## 2023-08-12 NOTE — CARE PLAN
The patient is Stable - Low risk of patient condition declining or worsening    Shift Goals  Clinical Goals: Finish iron, changed PIV dressing, mobility, lab monitoring, rest  Patient Goals: Comfort, rest  Family Goals: plan of care, rest for the patient    Progress made toward(s) clinical / shift goals:  Patient tolerated IV Iron infusion at beginning of shift. Changed PIV dressing to left AC, patient noted touching both PIV. Pulled bilateral AC PIV, placed left hand 22 gauge. Patient tolerated. Patient ambulating to/from bathroom with SBA with family, steady gait noted. + BM during AM shift, requested not to flush next time she has a BM. Patient slept intermittently during shift. Tums prn administered for heartburn; effective.     Patient is not progressing towards the following goals: NA.

## 2023-08-12 NOTE — PROCEDURES
DATE OF PROCEDURE:  08/11/2023     PROCEDURE:  Upper endoscopy with biopsies.     :  Asad Torres MD     INDICATIONS:  The patient is a 63-year-old female with history of recurrent   severe iron deficiency anemia, transferred from an outside hospital.  She   denies any hamida melena or hematochezia currently.  Mild epigastric pain.  She   does take NSAIDs frequently.  She had a similar episode in 2020 and had upper   endoscopy and colonoscopy and believes she was told she had gastritis only.     INFORMED CONSENT:  Written informed consent was obtained from the patient   after thorough explanation of indications, benefits, and risks of the   procedure, which included but was not limited to bleeding, infection,   perforation, adverse reaction to medications and missed lesions.     MEDICATIONS GIVEN:  Monitored anesthesia care with propofol.     Continuous telemetry, BP, pulse oximetry, and capnography were performed by   the anesthesiologist throughout the procedure.     Midsize flexible upper endoscope was used for the procedure.     PROCEDURE:  The patient was placed in left lateral decubitus position.  After   anesthesia was achieved, the endoscope was passed in the posterior pharynx   under direct visualization, advanced to the second portion of the duodenum   without difficulty.  The patient tolerated the procedure well with following   findings:  1.  Esophagus:  Normal.  2.  Stomach:  She had a small 2 cm hiatal hernia.  She had a focal area of   erythema with small erosions within the gastric fundus, which was soft.    Biopsies were obtained of this area and there was some friability to this area   with biopsies and even minimal scope trauma.  The remainder of the gastric   mucosa appeared normal.  Random gastric biopsies were obtained for H. pylori.  3.  Duodenum:  Normal.  Random biopsies obtained for celiac disease.     IMPRESSION:  1.  Erosive gastropathy in the fundus, question NSAID  related or medication   related.  Biopsies obtained.  2.  Small hiatal hernia.  3.  Random biopsies obtained for H. pylori and celiac disease given iron   deficiency.     PLAN:  1.  Await biopsies.  2.  PPI daily.  3.  Sucralfate 1 gram 4 times daily for 2 weeks.  4.  Still would likely recommend repeat colonoscopy as well as potential   capsule endoscopy as an outpatient.  This was not need to be done emergently   given lack of overt bleeding.  5.  Continue ferrous sulfate 325 mg twice daily with meals and vitamin C.  6.  Advanced diet.  7.  GI to sign off.  Please call with any questions.        ______________________________  MD ADILSON JORGE/CARYL/RAJAN    DD:  08/11/2023 11:28  DT:  08/11/2023 11:53    Job#:  [id number]

## 2023-08-12 NOTE — PROGRESS NOTES
Patient given discharge instructions in Lithuanian. Discussed diet, activity, follow up appointments, after care, symptoms and management, and prescriptions provided. Daughter with patient and discussed discharge packet with daughter as well. Packet sent with patient.  IV d/c'd, discharge paperwork signed, and all questions answered. Patient discharged home via car with relative. Patient discharged from discharge lounge via walking.

## 2023-08-12 NOTE — CARE PLAN
Problem: Knowledge Deficit - Standard  Goal: Patient and family/care givers will demonstrate understanding of plan of care, disease process/condition, diagnostic tests and medications  8/12/2023 1018 by Jorge Arias R.N.  Outcome: Met  8/12/2023 1018 by Jorge Arias R.N.  Outcome: Progressing     Problem: Pain - Standard  Goal: Alleviation of pain or a reduction in pain to the patient’s comfort goal  8/12/2023 1018 by Jorge Arias R.N.  Outcome: Met  8/12/2023 1018 by Jorge Arias R.N.  Outcome: Progressing     Problem: Psychosocial  Goal: Patient's level of anxiety will decrease  Outcome: Met     Problem: Communication  Goal: The ability to communicate needs accurately and effectively will improve  Outcome: Met     Problem: Discharge Barriers/Planning  Goal: Patient's continuum of care needs are met  8/12/2023 1018 by Jorge Arias R.N.  Outcome: Met  8/12/2023 1018 by Jorge Arias R.N.  Outcome: Progressing     Problem: Hemodynamics  Goal: Patient's hemodynamics, fluid balance and neurologic status will be stable or improve  Outcome: Met     Problem: Respiratory  Goal: Patient will achieve/maintain optimum respiratory ventilation and gas exchange  Outcome: Met     Problem: Nutrition  Goal: Patient's nutritional and fluid intake will be adequate or improve  Outcome: Met  Goal: Enteral nutrition will be maintained or improve  Outcome: Met  Goal: Enteral nutrition will be maintained or improve  Outcome: Met     Problem: Urinary Elimination  Goal: Establish and maintain regular urinary output  Outcome: Met     Problem: Bowel Elimination  Goal: Establish and maintain regular bowel function  Outcome: Met     Problem: Mobility  Goal: Patient's capacity to carry out activities will improve  Outcome: Met     Problem: Self Care  Goal: Patient will have the ability to perform ADLs independently or with assistance (bathe, groom, dress, toilet and feed)  Outcome: Met

## 2023-08-12 NOTE — DISCHARGE SUMMARY
Hospital Medicine Discharge Note     Admit Date:  8/10/2023       Discharge Date:   8/12/2023  LOS: 2 days     Primary Care Provider:    Pcp Pt States None    Attending Physician:  Alexandria Waldrop M.D.     Discharge Diagnoses:   Principal Problem:    GIB (gastrointestinal bleeding)  Active Problems:    Complicated UTI (urinary tract infection)    Headache    Acute blood loss anemia    Syncope        Hospital Summary (Brief Narrative):         64 yo w/ hx of PUD, recent use of NSAIDs for headaches, presented to [facility] with unwitnessed syncope found to be severely anemic with Hb of 6 s/p 1 unit of pRBC. CTH and CT C spine neg. CT A/P without acute findingds. Started on IV PPI and transferred to [facility] for an EGD. EGD 8/10 by Dr. Torres showed focal erythema and erosions of gastric fundus that would not explain an anemia this severe. Pt was HD stable, bowel movements normal, thus colonoscopy was deferred to outpatient. Started on iron tabs. OSH Ucx w up to 99k GNR, s/p IV CTX x3 days.     * GIB (gastrointestinal bleeding)  Assessment & Plan  Pt has hx of PUD, recent frequent use of NSAIDs pw syncope 2/2 severe anemia  OSH Hb 6 s/p 1 unit of pRBC  GI consulted, Dr. Torres performed EGD 8/11 which showed gastric fundus erosions which wouldn't explain anemia severity  Given stable condition, no obvious melena in house, GI recommended outpatient colonoscopy  Start iron tabs daily upon dc  Continue PPI and carafate  Sxs free, Hb stable in 8s at time of dc     Syncope  Assessment & Plan  2/2 severe anemia  Work up as above     Acute blood loss anemia  Assessment & Plan  Hb baseline in 11s , was in 6s at OSH now stable in 7s  Sp 1 unit pRBC  EGD 8/11 w/ focal erythema and erosions of gastric fundus, not severe enough to explain degree of anemia  Plan for outpatient colonoscopy  B12 and folate nl, iron tabs started   Repeat CBC in 1-2 weeks in [place] w/ PCP     Headache  Assessment & Plan  Has been taking nsaids  regularly for headaches  Currently w/ neck pain post fall  Cervical spine imaging benign  CTH at OSH neg for bleed/acute findingds  Avoid NSAIDS  Tylenol PRN  Rx'd Fioricet prn for headache which had resolved her pain in house      UTI (urinary tract infection)- (present on admission)  Assessment & Plan  Found to have UTI at outside facility  Rocephin given from outside facility  UCX 55-99k GNR at OSH, final speciation pending  S/p addition 2 doses of IV CTX (8/11-8/12)  Asxs no further abx prescribed    Disposition:   Discharge home    Condition:  Stable    Activity:   As tolerated     Diet:   Heart Healthy Diet / Diabetic Diet    Discharge Medications:           Medication List        START taking these medications        Instructions   butalbital/apap/caffeine -40 mg Tabs  Commonly known as: Fioricet   Take 2 Tablets by mouth every 6 hours as needed for Headache for up to 3 days.  Dose: 2 Tablet     FeroSul 325 (65 Fe) MG tablet  Start taking on: August 13, 2023  Generic drug: ferrous sulfate   Take 1 Tablet by mouth every morning with breakfast for 30 days.  Dose: 325 mg     lidocaine 5 % Ptch  Commonly known as: Lidoderm   Place 1 Patch on the skin every 24 hours for 10 days. Apply to area where it hurts. 12 hours on, 12 hours off  Dose: 1 Patch     omeprazole 20 MG delayed-release capsule  Start taking on: August 13, 2023  Commonly known as: PriLOSEC   Take 1 Capsule by mouth every day for 30 days.  Dose: 20 mg     sucralfate 1 GM Tabs  Commonly known as: Carafate   Take 1 Tablet by mouth every 6 hours for 14 days.  Dose: 1 g            CONTINUE taking these medications        Instructions   ondansetron 4 MG Tbdp  Commonly known as: Zofran ODT   Take 1 Tab by mouth every 6 hours as needed for Nausea/Vomiting.  Dose: 4 mg                Follow up appointment details :      I encouraged her to call his PCP to confirm follow up after discharge.    No future appointments.      Consultants:       GI    Studies:    Imaging/ Testing:      DX-CERVICAL SPINE-2 OR 3 VIEWS   Final Result      No acute fracture or listhesis in the cervical spine.          Procedures:        EGD      Instructions:      The were given instructions to return to the ER if patient's condition worsens      Time Spent on Discharge:     Discharge instructions were discussed with the patient at bedside. Patient  expressed understanding and agreed to comply with all discharge instructions.    37 minutes were spent in the discharge planning and management of this  patient, including more than 50% of the time spent face to face in   Counseling.

## 2023-08-12 NOTE — CARE PLAN
Problem: Knowledge Deficit - Standard  Goal: Patient and family/care givers will demonstrate understanding of plan of care, disease process/condition, diagnostic tests and medications  Outcome: Progressing     Problem: Pain - Standard  Goal: Alleviation of pain or a reduction in pain to the patient’s comfort goal  Outcome: Progressing     Problem: Discharge Barriers/Planning  Goal: Patient's continuum of care needs are met  Outcome: Progressing   The patient is Stable - Low risk of patient condition declining or worsening    Shift Goals  Clinical Goals: discharge home  Patient Goals: go home  Family Goals: plan of care, rest for the patient    Progress made toward(s) clinical / shift goals:  patient discharged home, waiting for meds to bed.    Patient is not progressing towards the following goals:

## 2023-08-12 NOTE — DISCHARGE INSTRUCTIONS
You were hospitalized for anemia.  You underwent an endoscopy which showed some areas of inflammation of your stomach. No ulcers. This likely does not explain why her hemoglobin was so low at 6. Since your hemoglobin was stable in 8s and you did not have further episode of bleed, Dr. Edwards (GI team) has advised to return as outpatient for colonoscopy. Please call on Monday to make an appointment.   In the meantime, take omeprazole daily for at least 4 weeks, and sucrafate for 2 weeks. We also have prescribed iron tablets.  For your headaches, we have prescribed short cource of fioricet   Please follow up with your primary care doctor within 1- 2 weeks and repeat your blood count

## (undated) DEVICE — TRAY CATHETER FOLEY URINE METER W/STATLOCK 350ML (10EA/CA)

## (undated) DEVICE — GOWN SURGICAL X-LARGE ULTRA - FILM-REINFORCED (20/CA)

## (undated) DEVICE — NEPTUNE 4 PORT MANIFOLD - (20/PK)

## (undated) DEVICE — GOWN SURGEONS LARGE - (32/CA)

## (undated) DEVICE — GLOVE BIOGEL PI ULTRATOUCH SZ 8.5 SURGICAL PF LF - (200PR/CA)

## (undated) DEVICE — BLOCK BITE MAXI DENTAL RETENTION RIM (100EA/BX)

## (undated) DEVICE — CATHETER URET OPEN END 6FR (10EA/BX)

## (undated) DEVICE — BUTTON ENDOSCOPY DISPOSABLE

## (undated) DEVICE — CONTAINER, SPECIMEN, STERILE

## (undated) DEVICE — MASK ANESTHESIA ADULT  - (100/CA)

## (undated) DEVICE — COVER LIGHT HANDLE FLEXIBLE - SOFT (2EA/PK 80PK/CA)

## (undated) DEVICE — SET EXTENSION WITH 2 PORTS (48EA/CA) ***PART #2C8610 IS A SUBSTITUTE*****

## (undated) DEVICE — PACK CYSTOSCOPY III - (8/CA)

## (undated) DEVICE — GLOVE SURGICAL PROTEXIS PI 8.0 LF - (50PR/BX)

## (undated) DEVICE — PORT AUXILLARY WATER (50EA/BX)

## (undated) DEVICE — KIT ANESTHESIA W/CIRCUIT & 3/LT BAG W/FILTER (20EA/CA)

## (undated) DEVICE — GUIDEWIRE HYDROPHILIC COATED STRAIGHT TIP GLIDEWIRE .035 X 180CM (5EA/BX)"

## (undated) DEVICE — FORCEP RADIAL JAW 4 STANDARD CAPACITY W/NEEDLE 240CM (40EA/BX)

## (undated) DEVICE — TUBE CONNECTING SUCTION - CLEAR PLASTIC STERILE 72 IN (50EA/CA)

## (undated) DEVICE — ELECTRODE 850 FOAM ADHESIVE - HYDROGEL RADIOTRNSPRNT (50/PK)

## (undated) DEVICE — TRAY SRGPRP PVP IOD WT PRP - (20/CA)

## (undated) DEVICE — KIT CUSTOM PROCEDURE SINGLE FOR ENDO  (15/CA)

## (undated) DEVICE — TUBING CLEARLINK DUO-VENT - C-FLO (48EA/CA)

## (undated) DEVICE — WATER IRRIG. STER 3000 ML - (4/CA)

## (undated) DEVICE — SET IRRIGATION CYSTOSCOPY Y-TYPE L81 IN (20EA/CA)

## (undated) DEVICE — GLOVE BIOGEL PI INDICATOR SZ 8.0 SURGICAL PF LF -(50/BX 4BX/CA)

## (undated) DEVICE — CANISTER SUCTION RIGID RED 1500CC (40EA/CA)

## (undated) DEVICE — SODIUM CHL IRRIGATION 0.9% 1000ML (12EA/CA)

## (undated) DEVICE — FILM CASSETTE ENDO

## (undated) DEVICE — GLOVE BIOGEL SZ 8 SURGICAL PF LTX - (50PR/BX 4BX/CA)

## (undated) DEVICE — WATER IRRIGATION STERILE 1000ML (12EA/CA)

## (undated) DEVICE — TOWEL STOP TIMEOUT SAFETY FLAG (40EA/CA)

## (undated) DEVICE — SENSOR OXIMETER ADULT SPO2 RD SET (20EA/BX)

## (undated) DEVICE — MASK PANORAMIC OXYGEN PRO2 (30EA/CA)

## (undated) DEVICE — SYRINGE 20 ML LL (50EA/BX 4BX/CA)

## (undated) DEVICE — SET LEADWIRE 5 LEAD BEDSIDE DISPOSABLE ECG (1SET OF 5/EA)